# Patient Record
Sex: MALE | Race: WHITE | NOT HISPANIC OR LATINO | Employment: OTHER | URBAN - METROPOLITAN AREA
[De-identification: names, ages, dates, MRNs, and addresses within clinical notes are randomized per-mention and may not be internally consistent; named-entity substitution may affect disease eponyms.]

---

## 2018-01-02 ENCOUNTER — ALLSCRIPTS OFFICE VISIT (OUTPATIENT)
Dept: OTHER | Facility: OTHER | Age: 63
End: 2018-01-02

## 2018-01-02 LAB
BILIRUB UR QL STRIP: NEGATIVE
CLARITY UR: NORMAL
COLOR UR: YELLOW
GLUCOSE (HISTORICAL): NEGATIVE
HGB UR QL STRIP.AUTO: NEGATIVE
KETONES UR STRIP-MCNC: NEGATIVE MG/DL
LEUKOCYTE ESTERASE UR QL STRIP: NEGATIVE
NITRITE UR QL STRIP: NEGATIVE
PH UR STRIP.AUTO: 6 [PH]
PROT UR STRIP-MCNC: NEGATIVE MG/DL
SP GR UR STRIP.AUTO: 1.01
UROBILINOGEN UR QL STRIP.AUTO: 0.2

## 2018-01-23 VITALS
BODY MASS INDEX: 28.79 KG/M2 | WEIGHT: 190 LBS | HEIGHT: 68 IN | DIASTOLIC BLOOD PRESSURE: 58 MMHG | SYSTOLIC BLOOD PRESSURE: 112 MMHG

## 2018-01-30 RX ORDER — HYDROCODONE BITARTRATE AND ACETAMINOPHEN 5; 325 MG/1; MG/1
1 TABLET ORAL
COMMUNITY
Start: 2018-01-02 | End: 2018-02-15 | Stop reason: ALTCHOICE

## 2018-01-30 RX ORDER — LEVOFLOXACIN 500 MG/1
TABLET, FILM COATED ORAL
COMMUNITY
Start: 2018-01-02 | End: 2018-02-15 | Stop reason: ALTCHOICE

## 2018-01-30 RX ORDER — MULTIVIT-MIN/FOLIC/VIT K/LYCOP 400-300MCG
1 TABLET ORAL DAILY
COMMUNITY

## 2018-01-30 RX ORDER — DIAZEPAM 10 MG/1
1 TABLET ORAL
COMMUNITY
Start: 2018-01-02 | End: 2018-02-15 | Stop reason: ALTCHOICE

## 2018-02-01 ENCOUNTER — PROCEDURE VISIT (OUTPATIENT)
Dept: UROLOGY | Facility: MEDICAL CENTER | Age: 63
End: 2018-02-01
Payer: COMMERCIAL

## 2018-02-01 VITALS — SYSTOLIC BLOOD PRESSURE: 120 MMHG | WEIGHT: 188 LBS | DIASTOLIC BLOOD PRESSURE: 64 MMHG | BODY MASS INDEX: 28.59 KG/M2

## 2018-02-01 DIAGNOSIS — R97.20 ELEVATED PSA: Primary | ICD-10-CM

## 2018-02-01 PROCEDURE — G0416 PROSTATE BIOPSY, ANY MTHD: HCPCS | Performed by: UROLOGY

## 2018-02-01 PROCEDURE — 88344 IMHCHEM/IMCYTCHM EA MLT ANTB: CPT | Performed by: UROLOGY

## 2018-02-01 PROCEDURE — 96372 THER/PROPH/DIAG INJ SC/IM: CPT | Performed by: UROLOGY

## 2018-02-01 PROCEDURE — 55700 PR BIOPSY OF PROSTATE,NEEDLE/PUNCH: CPT | Performed by: UROLOGY

## 2018-02-01 PROCEDURE — 76872 US TRANSRECTAL: CPT | Performed by: UROLOGY

## 2018-02-01 RX ORDER — GENTAMICIN SULFATE 40 MG/ML
80 INJECTION, SOLUTION INTRAMUSCULAR; INTRAVENOUS ONCE
Status: COMPLETED | OUTPATIENT
Start: 2018-02-01 | End: 2018-02-01

## 2018-02-01 RX ADMIN — GENTAMICIN SULFATE 80 MG: 40 INJECTION, SOLUTION INTRAMUSCULAR; INTRAVENOUS at 11:26

## 2018-02-01 NOTE — PROGRESS NOTES
Procedure note    Pre-procedure diagnosis:  Elevated PSA  Postprocedure diagnosis:  Same  Procedure:  Transrectal ultrasound of the prostate and prostate needle biopsy  Surgeon:  Rk Ortiz MD    Course of procedure: The patient is a 22-year-old man with a PSA elevation to 14  He is here for prostate biopsy for this reason  The risks of bleeding infection and urinary retention have been explained and he gives informed consent  The patient was prepared in the lateral decubitus position  He was given 80 mg of gentamicin prior to the procedure and already had taken levaquin, Valium and Vicodin for the procedure  Transrectal probe was placed into the rectum and the prostate was visualized  It measured approximately 40 g  overall inspection of the prostate revealed no overt abnormalities  Romina Piedra 1% xylocaine was injected into the area surrounding the neurovascular bundles on each side trans  Rectally, then biopsies were taken from 6 regions, 3 on each side, 2 from each section  Patient tolerated the procedure well and held digital pressure on the prostate for a couple of minutes afterwards  He was told to call if he had fever shakes and chills, or he was unable urinate  He was told to expect blood in the semen, blood in the urine and blood in the stool  I will call him with the results  He is to call if he does not hear from me in a week  He will be set up for an appointment in 6 months with a repeat PSA in the event that the biopsy is negative

## 2018-02-05 DIAGNOSIS — C61 PROSTATE CANCER (HCC): Primary | ICD-10-CM

## 2018-02-08 ENCOUNTER — TELEPHONE (OUTPATIENT)
Dept: UROLOGY | Facility: MEDICAL CENTER | Age: 63
End: 2018-02-08

## 2018-02-08 ENCOUNTER — HOSPITAL ENCOUNTER (OUTPATIENT)
Dept: NUCLEAR MEDICINE | Facility: HOSPITAL | Age: 63
Discharge: HOME | End: 2018-02-08
Attending: UROLOGY
Payer: COMMERCIAL

## 2018-02-08 ENCOUNTER — HOSPITAL ENCOUNTER (OUTPATIENT)
Dept: CT IMAGING | Facility: HOSPITAL | Age: 63
Discharge: HOME | End: 2018-02-08
Attending: UROLOGY
Payer: COMMERCIAL

## 2018-02-08 DIAGNOSIS — C61 PROSTATE CANCER (HCC): ICD-10-CM

## 2018-02-08 PROCEDURE — 78306 BONE IMAGING WHOLE BODY: CPT

## 2018-02-08 PROCEDURE — 74177 CT ABD & PELVIS W/CONTRAST: CPT

## 2018-02-08 PROCEDURE — A9503 TC99M MEDRONATE: HCPCS

## 2018-02-08 RX ADMIN — IOHEXOL 100 ML: 350 INJECTION, SOLUTION INTRAVENOUS at 09:46

## 2018-02-08 RX ADMIN — IOHEXOL 50 ML: 240 INJECTION, SOLUTION INTRATHECAL; INTRAVASCULAR; INTRAVENOUS; ORAL at 08:00

## 2018-02-08 NOTE — TELEPHONE ENCOUNTER
----- Message from Kyra Cali MD sent at 2/8/2018  4:30 PM EST -----  Please inform patient that the results are normal

## 2018-02-12 ENCOUNTER — OFFICE VISIT (OUTPATIENT)
Dept: UROLOGY | Facility: MEDICAL CENTER | Age: 63
End: 2018-02-12
Payer: COMMERCIAL

## 2018-02-12 VITALS
DIASTOLIC BLOOD PRESSURE: 60 MMHG | HEIGHT: 68 IN | BODY MASS INDEX: 28.95 KG/M2 | SYSTOLIC BLOOD PRESSURE: 118 MMHG | WEIGHT: 191 LBS

## 2018-02-12 DIAGNOSIS — R97.20 ELEVATED PSA: Primary | ICD-10-CM

## 2018-02-12 LAB
SL AMB  POCT GLUCOSE, UA: NEGATIVE
SL AMB LEUKOCYTE ESTERASE,UA: ABNORMAL
SL AMB POCT BILIRUBIN,UA: NEGATIVE
SL AMB POCT BLOOD,UA: ABNORMAL
SL AMB POCT CLARITY,UA: CLEAR
SL AMB POCT COLOR,UA: YELLOW
SL AMB POCT KETONES,UA: NEGATIVE
SL AMB POCT NITRITE,UA: NEGATIVE
SL AMB POCT PH,UA: 5.5
SL AMB POCT SPECIFIC GRAVITY,UA: <=1.005
SL AMB POCT URINE PROTEIN: NEGATIVE
SL AMB POCT UROBILINOGEN: 0.2

## 2018-02-12 PROCEDURE — 81003 URINALYSIS AUTO W/O SCOPE: CPT | Performed by: UROLOGY

## 2018-02-12 PROCEDURE — 99214 OFFICE O/P EST MOD 30 MIN: CPT | Performed by: UROLOGY

## 2018-02-12 NOTE — LETTER
2018     M[place] Santos, [address] [facility]    Patient: Diego Arciniega   YOB: 1955   Date of Visit: 2018       Dear Dr Lindy Justin: Thank you for referring Vivienne Laguna to me for evaluation  Below are my notes for this consultation  If you have questions, please do not hesitate to call me  I look forward to following your patient along with you  Sincerely,        Mona Gibbs MD        CC: No Recipients  Mona Gibbs MD  2018  9:25 AM  Sign at close encounter  [address] [facility]  [place] [phone]  www  [facility]  org      NAME: Diego Arciniega  AGE: 58 y o  SEX: male  : 1955   MRN: [id number]    DATE: 2018  TIME: 9:03 AM    Assessment and Plan:  Prostate cancer as outlined  We discussed his options  I recommend laparoscopic robotic prostatectomy and will refer him to Dr Giselle Fernandez for consultation regarding this  Metastatic workup is negative  Return to office in:  As above    Chief Complaint   No chief complaint on file  History of Present Illness   Shae 7 and 6 CAP, encompassing the entire gland found on TRUS bx 2018, PSA 14 6  CT/Bone scan negative  Here to discuss options  Options mention were robotic prostatectomy, radiation therapy, cryotherapy, and some other therapies were discussed  Given the volume of disease, and is age in good health status, I recommend robotic prostatectomy  The following portions of the patient's history were reviewed and updated as appropriate: allergies, current medications, past family history, past medical history, past social history, past surgical history and problem list     Review of Systems   Review of Systems    Active Problem List   There is no problem list on file for this patient        Objective   /60 (BP Location: Left arm, Patient Position: Sitting)   Ht 5' 8" (1 727 m)   Wt 86 6 kg (191 lb)   BMI 29 04 kg/m²      Physical Exam    Pertinent Laboratory/Diagnostic Studies:  {[id number]}    Current Medications     Current Outpatient Prescriptions:     diazepam (VALIUM) 10 mg tablet, Take 1 tablet by mouth, Disp: , Rfl:     HYDROcodone-acetaminophen (NORCO) 5-325 mg per tablet, Take 1 tablet by mouth, Disp: , Rfl:     levofloxacin (LEVAQUIN) 500 mg tablet, Take by mouth, Disp: , Rfl:     Multiple Vitamins-Minerals (ONE DAILY MULTIVITAMIN ADULT) TABS, Take 1 tablet by mouth daily, Disp: , Rfl:     Omega-3 Fatty Acids (FISH OIL) 645 MG CAPS, Take 3 capsules by mouth daily, Disp: , Rfl:         Lesvia Malin MD

## 2018-02-12 NOTE — LETTER
2018     [place], [address] [place] [address]    Patient: Taina Zamudio   YOB: 1955   Date of Visit: 2018       Dear Dr Valery Walls: Thank you for referring Vincenzo Martinez to me for evaluation  Below are my notes for this consultation  If you have questions, please do not hesitate to call me  I look forward to following your patient along with you  Sincerely,        Karuna Gonzalez MD        CC: No Recipients  Karuna Gonzalez MD  2018  9:26 AM  Sign at close encounter  [address] [facility]  [address], [address] [place] [phone]  www  [facility]  org      NAME: Taina Zamudio  AGE: 58 y o  SEX: male  : 1955   MRN: [id number]    DATE: 2018  TIME: 9:03 AM    Assessment and Plan:  Prostate cancer as outlined  We discussed his options  I recommend laparoscopic robotic prostatectomy and will refer him to Dr Aron Arguelles for consultation regarding this  Metastatic workup is negative  Return to office in:  As above    Chief Complaint   No chief complaint on file  History of Present Illness   Shae 7 and 6 CAP, encompassing the entire gland found on TRUS bx 2018, PSA 14 6  CT/Bone scan negative  Here to discuss options  Options mention were robotic prostatectomy, radiation therapy, cryotherapy, and some other therapies were discussed  Given the volume of disease, and is age in good health status, I recommend robotic prostatectomy  The following portions of the patient's history were reviewed and updated as appropriate: allergies, current medications, past family history, past medical history, past social history, past surgical history and problem list     Review of Systems   Review of Systems    Active Problem List   There is no problem list on file for this patient        Objective   /60 (BP Location: Left arm, Patient Position: Sitting)   Ht 5' 8" (1 727 m)   Wt 86 6 kg (191 lb)   BMI 29 04 kg/m²      Physical Exam    Pertinent Laboratory/Diagnostic Studies:  CBC: No results found for: WBC, RBC, HGB, HCT, MCV, MCH, MCHC, RDW, MPV, PLT, NRBC, NEUTOPHILPCT, LYMPHOPCT, MONOPCT, EOSPCT, BASOPCT, NEUTROABS, LYMPHSABS, MONOSABS, EOSABS, MONOSABS  Chemistry Profile:   Lab Results   Component Value Date/Time    SLAMBGLUCOSE negative 02/12/2018 08:58 AM       Current Medications     Current Outpatient Prescriptions:     diazepam (VALIUM) 10 mg tablet, Take 1 tablet by mouth, Disp: , Rfl:     HYDROcodone-acetaminophen (NORCO) 5-325 mg per tablet, Take 1 tablet by mouth, Disp: , Rfl:     levofloxacin (LEVAQUIN) 500 mg tablet, Take by mouth, Disp: , Rfl:     Multiple Vitamins-Minerals (ONE DAILY MULTIVITAMIN ADULT) TABS, Take 1 tablet by mouth daily, Disp: , Rfl:     Omega-3 Fatty Acids (FISH OIL) 645 MG CAPS, Take 3 capsules by mouth daily, Disp: , Rfl:         Matthew Hernandez MD

## 2018-02-12 NOTE — PROGRESS NOTES
[address] [facility]  [facility]  [address] [facility]  [place], [address] [facility]  [phone]  www  [facility]  org      NAME: Taina Zamudio  AGE: 58 y o  SEX: male  : 1955   MRN: [id number]    DATE: 2018  TIME: 9:03 AM    Assessment and Plan:  Prostate cancer as outlined  We discussed his options  I recommend laparoscopic robotic prostatectomy and will refer him to Dr Aron Arguelles for consultation regarding this  Metastatic workup is negative  Return to office in:  As above    Chief Complaint   No chief complaint on file  History of Present Illness   Shae 7 and 6 CAP, encompassing the entire gland found on TRUS bx 2018, PSA 14 6  CT/Bone scan negative  Here to discuss options  Options mention were robotic prostatectomy, radiation therapy, cryotherapy, and some other therapies were discussed  Given the volume of disease, and is age in good health status, I recommend robotic prostatectomy  The following portions of the patient's history were reviewed and updated as appropriate: allergies, current medications, past family history, past medical history, past social history, past surgical history and problem list     Review of Systems   Review of Systems    Active Problem List   There is no problem list on file for this patient        Objective   /60 (BP Location: Left arm, Patient Position: Sitting)   Ht 5' 8" (1 727 m)   Wt 86 6 kg (191 lb)   BMI 29 04 kg/m²     Physical Exam    Pertinent Laboratory/Diagnostic Studies:  CBC: No results found for: WBC, RBC, HGB, HCT, MCV, MCH, MCHC, RDW, MPV, PLT, NRBC, NEUTOPHILPCT, LYMPHOPCT, MONOPCT, EOSPCT, BASOPCT, NEUTROABS, LYMPHSABS, MONOSABS, EOSABS, MONOSABS  Chemistry Profile:   Lab Results   Component Value Date/Time    SLAMBGLUCOSE negative 2018 08:58 AM       Current Medications     Current Outpatient Prescriptions:     diazepam (VALIUM) 10 mg tablet, Take 1 tablet by mouth, Disp: , Rfl:     HYDROcodone-acetaminophen (NORCO) 5-325 mg per tablet, Take 1 tablet by mouth, Disp: , Rfl:     levofloxacin (LEVAQUIN) 500 mg tablet, Take by mouth, Disp: , Rfl:     Multiple Vitamins-Minerals (ONE DAILY MULTIVITAMIN ADULT) TABS, Take 1 tablet by mouth daily, Disp: , Rfl:     Omega-3 Fatty Acids (FISH OIL) 645 MG CAPS, Take 3 capsules by mouth daily, Disp: , Rfl:         Jere Reyes MD

## 2018-02-15 ENCOUNTER — OFFICE VISIT (OUTPATIENT)
Dept: UROLOGY | Facility: MEDICAL CENTER | Age: 63
End: 2018-02-15
Payer: COMMERCIAL

## 2018-02-15 VITALS
BODY MASS INDEX: 28.95 KG/M2 | WEIGHT: 191 LBS | DIASTOLIC BLOOD PRESSURE: 68 MMHG | SYSTOLIC BLOOD PRESSURE: 116 MMHG | HEIGHT: 68 IN

## 2018-02-15 DIAGNOSIS — N13.8 BPH WITH OBSTRUCTION/LOWER URINARY TRACT SYMPTOMS: ICD-10-CM

## 2018-02-15 DIAGNOSIS — C61 PROSTATE CANCER (HCC): Primary | ICD-10-CM

## 2018-02-15 DIAGNOSIS — N40.1 BPH WITH OBSTRUCTION/LOWER URINARY TRACT SYMPTOMS: ICD-10-CM

## 2018-02-15 PROCEDURE — 99205 OFFICE O/P NEW HI 60 MIN: CPT | Performed by: UROLOGY

## 2018-02-15 NOTE — LETTER
February 15, 2018     [facility] [address] [place] [address]    Patient: Khanh Hendrix   YOB: 1955   Date of Visit: 2/15/2018       Dear Dr Amor Chaparro: Thank you for referring Lux Copeland to me for evaluation  Below are my notes for this consultation  If you have questions, please do not hesitate to call me  I look forward to following your patient along with you  Sincerely,        Kellie Gomez MD        CC: No Recipients  Kellie Gomez MD  2/15/2018  2:38 PM  Sign at close encounter    Khanh Hendrix 58 y o  male MRN: [id number]  Unit/Bed#:  Encounter: [id number]    Assessment/Plan   Assessment:  Localized prostate cancer    Plan:  Plan:  After careful review and summary of the medical history and all laboratory and radiographic studies as outlined above, I discussed with the patient and family members at length the finding of adenocarcinoma of the prostate  We discussed the patient's grade AUSTIN SCORE 6 AND 7, other parameters, and clinical stage based upon the above findings  I then discussed the options available to him at this time, based on his clinically localized prostate cancer  These include observation, radiation therapy, radical prostatectomy and cryosurgery  Regarding observation, they understand this is ideally suited for men with either clinically insignificant or slow growing cancer or for whom because of age or other concurrent medical conditions the risks of treatment are greater than the potential benefits of treatment  While this option avoids treatment-associated side effects, I explained it requires frequent evaluation in order to monitor for potential disease progression, and that despite vigilant follow-up there will be cases where the cancer spreads and is no longer potentially curable with local therapies   I reiterated that for many men, watchful waiting can be associated with increased anxiety and stress concerning their diagnosis, and that commonly patients only defer definitive treatment to a later date  With regard to radiation therapy, I reviewed the options available  These include external beam radiation therapy versus brachytherapy  Regarding XRT, we discussed the differences in conventional versus 3D conformal external beam radiation therapy versus IMRT  The indications, risks and potential complications of each of these options as well as the rationale for using selective radiation options were reviewed in detail  We also discussed interstitial seed therapy and risk stratification as a determinant for optimal therapies  All questions were again answered  I did offer to refer the patient to the radiation oncologists directly in order to further clarify these issues  With regard to the surgical options, we reviewed the different options including a radical perineal prostatectomy versus the radical retropubic prostatectomy via open, laparoscopic and robotic approach  I explained the operations that I primarily perform, robotic-assisted laparoscopic radical prostatectomy, and compared it with the alternative that I am also experienced with, traditional open retropubic prostatectomy  I also mentioned the less commonly used but accepted perineal approach  The advantages and limitations of these various approaches were described in detail  The anticipated hospital and post-operative courses were reviewed  I highlighted the relevant anatomy, and we discussed the importance of neurovascular bundle preservation (nerve-sparing) to minimize negative effects on erectile function and continence   While in the majority of cases bilateral nerve-sparing is appropriate and possible, they understand that in certain circumstances intentional partial or complete unilateral or bilateral neurovascular bundle resection is necessary when there is suspicion that cancer extends into that region; this may be determined either preoperatively or intraoperatively with or without biopsy  The patient is clearly aware that he may still be rendered sexually impotent and incontinent as a consequence of the operative procedure  The possibility of stress-type urinary incontinence associated with either form of radical prostatectomy was also detailed for the patient  I explained that concurrent pelvic lymphadenectomy may be performed, for intermediate, or high risk clinical disease or suspicious intraoperative adenopathy, which serves both a diagnostic and potentially therapeutic purpose if metastatic disease is identified  We discussed the general risks of surgery, which include but are not limited to infection, bleeding, medical and anesthetic complications, and adjacent organ involvement or injury  Because of the large volume of cancer on his preoperative biopsy, I made aware that he may be at risk for needing adjuvant radiation therapy if extraprostatic extension of his cancer is found on the final pathologic specimen analysis  With regard to cryosurgery, this is felt to be an experimental option at this time secondary to insufficient data to support its routine use in this clinical setting  Regarding hormonal therapy, we discussed it is primarily used as systemic therapy for patients with advanced or metastatic disease, but that it also has been shown to be beneficial in conjunction with radiation therapy for certain categories of patients  The patient understands that hormonal therapy alone is not considered curative treatment and will slow the growth of but not eliminate prostate cancer  The patient was advised to become an active participant in their care and to become proactive in their care  They were encouraged to call my office with any unanswered questions or seek additional medical opinions at their discretion   They understand that the decision as to which approach to take is one made based on the medical risks and benefits as well as their own informed tolerance of this risk  He was offered referral to radiation oncology and is interested in surgery  Once the patient is scheduled for a RALP/PLND, he will need preadmission testing and anesthesia clearance  Kegel exercises were reviewed, and he may be seen, pre operatively, by our continence nurse  I have discussed the risks, benefits and alternatives to the proposed procedure/treatment plan with the patient   Our discussion also included the risks and benefits of the alternatives, including doing nothing  He was encouraged to ask questions and all questions were answered to their satisfaction  At the end of our discussion the patient gave their verbal consent to the proposed procedure/treatment plan, robotic radical prostatectomy and BTLND  History of Present Illness      HPI:  Cuba Arteaga is a 58 y o  male who presents with PSA of 14  Underwent a prostate biopsy results of which are below:  A  Prostate, left lateral base, core needle biopsy:             - Prostatic adenocarcinoma, Shae score 3 + 3 = 6, Prognostic Grade Group I, discontinuously involving approximately 50% of one core biopsy  - The diagnosis is supported by negative staining for basal cell markers (p63/), and positive luminal staining for p504s (prostate triple stain, performed with an appropriate control)      B  Prostate, left base, core needle biopsy:             - Benign prostate glands  - No malignancy is identified      C  Prostate, right base, core needle biopsy:             - Prostatic adenocarcinoma, Shae score 3 + 3 = 6, Prognostic Grade Group I, involving less than 5% of one core biopsy  - The diagnosis is supported by negative staining for basal cell markers (p63/), and positive luminal staining for p504s (prostate triple stain, performed with an appropriate control)      D   Prostate, right lateral base, core needle biopsy:             - Benign prostate glands  - No malignancy is identified      E  Prostate, left lateral mid, core needle biopsy:             - Prostatic adenocarcinoma, Shae score 3 + 3 = 6, Prognostic Grade Group I, discontinuously involving 90% of one core biopsy  - The diagnosis is supported by negative staining for basal cell markers (p63/), and positive luminal staining for p504s (prostate triple stain, performed with an appropriate control)      F  Prostate, left mid, core needle biopsy:             - Prostatic adenocarcinoma, Troy score 3 + 3 = 6, Prognostic Grade Group I, discontinuously involving 60% of one core biopsy  - The diagnosis is supported by negative staining for basal cell markers (p63/), and positive luminal staining for p504s (prostate triple stain, performed with an appropriate control)      G  Prostate, right mid, core needle biopsy:             - Prostatic adenocarcinoma, Shae score 3 + 3 = 6, Prognostic Grade Group I, involving less than 5% of one core biopsy  - The diagnosis is supported by negative staining for basal cell markers (p63/), and positive luminal staining for p504s (prostate triple stain, performed with an appropriate control)       H  Prostate, right lateral mid, core needle biopsy:             - Prostatic adenocarcinoma, Shae score 3 + 3 = 6, Prognostic Grade Group I, involving 20% of one core biopsy  - The diagnosis is supported by negative staining for basal cell markers (p63/), and positive luminal staining for p504s (prostate triple stain, performed with an appropriate control)      I  Prostate, left lateral apex, core needle biopsy:             - Prostatic adenocarcinoma, Troy score 3 + 4 = 7, Prognostic Grade Group II, involving 80% of one core biopsy              - Less than 5% Troy grade 4 prostatic adenocarcinoma               - The diagnosis is supported by negative staining for basal cell markers (p63/), and positive luminal staining for p504s (prostate triple stain, performed with an appropriate control)      J  Prostate, left apex, core needle biopsy:             - Prostatic adenocarcinoma, Shae score 3 + 4 = 7, Prognostic Grade Group II, involving 90% of one core biopsy              - Less than 10% Shae grade 4 prostatic adenocarcinoma  - The diagnosis is supported by negative staining for basal cell markers (p63/), and positive luminal staining for p504s (prostate triple stain, performed with an appropriate control)      K  Prostate, right apex, core needle biopsy:             - Benign prostate glands  - No malignancy is identified      L  Prostate, right lateral apex, core needle biopsy:             - Prostatic adenocarcinoma, Shae score 3 + 3 = 6, Prognostic Grade Group I, involving approximately 5% of one core biopsy  - The diagnosis is supported by negative staining for basal cell markers (p63/), and positive luminal staining for p504s (prostate triple stain, performed with an appropriate control)  Review of Systems   Constitutional: Negative  HENT: Negative  Eyes: Negative  Respiratory: Negative  Cardiovascular: Negative  Gastrointestinal: Negative  Endocrine: Negative  Genitourinary: Positive for urgency  Musculoskeletal: Positive for arthralgias and back pain  Skin: Negative  Allergic/Immunologic: Negative  Neurological: Negative  Hematological: Negative  Psychiatric/Behavioral: Negative  Historical Information   Past Medical History:   Diagnosis Date    BPH with obstruction/lower urinary tract symptoms 2013    Elevated PSA 2013     Past Surgical History:   Procedure Laterality Date    PROSTATE BIOPSY  2018 2007  RT   INGUINAL HERNIA REPAIR LAPAROSCOPIC APPROACH WITH POSSIBLE MESH    Social History   History   Alcohol Use    1 2 oz/week    2 Cans of beer per week     Comment: socially     History   Drug Use No     History   Smoking Status    Former Smoker    Types: Cigarettes    Quit date: 2/1/1998   Smokeless Tobacco    Never Used     Family History:   Family History   Problem Relation Age of Onset    Heart disease Father        Meds/Allergies   all medications and allergies reviewed  Allergies   Allergen Reactions    Sulfa Antibiotics        Objective   Vitals: Blood pressure 116/68, height 5' 8" (1 727 m), weight 86 6 kg (191 lb)  Invasive Devices          No matching active lines, drains, or airways          Physical Exam   Constitutional: He is oriented to person, place, and time  He appears well-developed  HENT:   Head: Normocephalic and atraumatic  Eyes: Conjunctivae and EOM are normal  Pupils are equal, round, and reactive to light  Neck: Normal range of motion  Neck supple  Cardiovascular: Normal rate, regular rhythm, normal heart sounds and intact distal pulses  Pulmonary/Chest: Effort normal and breath sounds normal    Abdominal: Soft  Bowel sounds are normal    Genitourinary: Rectum normal, prostate normal and penis normal    Genitourinary Comments: About 60 gms  Musculoskeletal: Normal range of motion  Neurological: He is alert and oriented to person, place, and time  Skin: Skin is warm and dry  Psychiatric: He has a normal mood and affect  His behavior is normal  Judgment and thought content normal        Lab Results: I have personally reviewed pertinent reports  Imaging: I have personally reviewed pertinent reports  EKG, Pathology, and Other Studies: I have personally reviewed pertinent reports  Counseling / Coordination of Care  Total floor / unit time spent today 80 minutes  Greater than 50% of total time was spent with the patient and / or family counseling and / or coordination of care    A description of the counseling / coordination of care:

## 2018-02-15 NOTE — H&P
H&P Exam - Urology   Taina Zamudio 58 y o  male MRN: [id number]  Unit/Bed#:  Encounter: [id number]    Assessment/Plan   Assessment:  Localized prostate cancer    Plan:  Plan:  LAP ROBOTIC RADICAL PROSTATECTOMY AND THE PL AND BPLND  After careful review and summary of the medical history and all laboratory and radiographic studies as outlined above, I discussed with the patient and family members at length the finding of adenocarcinoma of the prostate  We discussed the patient's grade ([place] score 6 and 7), other parameters, and clinical stage based upon the above findings  I then discussed the options available to him at this time, based on his clinically localized prostate cancer  These include observation, radiation therapy, radical prostatectomy and cryosurgery  Regarding observation, they understand this is ideally suited for men with either clinically insignificant or slow growing cancer or for whom because of age or other concurrent medical conditions the risks of treatment are greater than the potential benefits of treatment  While this option avoids treatment-associated side effects, I explained it requires frequent evaluation in order to monitor for potential disease progression, and that despite vigilant follow-up there will be cases where the cancer spreads and is no longer potentially curable with local therapies  I reiterated that for many men, watchful waiting can be associated with increased anxiety and stress concerning their diagnosis, and that commonly patients only defer definitive treatment to a later date  With regard to radiation therapy, I reviewed the options available  These include external beam radiation therapy versus brachytherapy  Regarding XRT, we discussed the differences in conventional versus 3D conformal external beam radiation therapy versus IMRT   The indications, risks and potential complications of each of these options as well as the rationale for using selective radiation options were reviewed in detail  We also discussed interstitial seed therapy and risk stratification as a determinant for optimal therapies  All questions were again answered  I did offer to refer the patient to the radiation oncologists directly in order to further clarify these issues  With regard to the surgical options, we reviewed the different options including a radical perineal prostatectomy versus the radical retropubic prostatectomy via open, laparoscopic and robotic approach  I explained the operations that I primarily perform, robotic-assisted laparoscopic radical prostatectomy, and compared it with the alternative that I am also experienced with, traditional open retropubic prostatectomy  I also mentioned the less commonly used but accepted perineal approach  The advantages and limitations of these various approaches were described in detail  The anticipated hospital and post-operative courses were reviewed  I highlighted the relevant anatomy, and we discussed the importance of neurovascular bundle preservation (nerve-sparing) to minimize negative effects on erectile function and continence  While in the majority of cases bilateral nerve-sparing is appropriate and possible, they understand that in certain circumstances intentional partial or complete unilateral or bilateral neurovascular bundle resection is necessary when there is suspicion that cancer extends into that region; this may be determined either preoperatively or intraoperatively with or without biopsy  The patient is clearly aware that he may still be rendered sexually impotent and incontinent as a consequence of the operative procedure  The possibility of stress-type urinary incontinence associated with either form of radical prostatectomy was also detailed for the patient   I explained that concurrent pelvic lymphadenectomy may be performed, for intermediate, or high risk clinical disease or suspicious intraoperative adenopathy, which serves both a diagnostic and potentially therapeutic purpose if metastatic disease is identified  We discussed the general risks of surgery, which include but are not limited to infection, bleeding, medical and anesthetic complications, and adjacent organ involvement or injury  Being the patient had a prior laparoscopic inguinal hernia repair with mesh in the past, because of substantial risk of perivesical adhesions preventing access the appropriate regions of the pelvis via the robotic laparoscopic approach, he and his wife are aware and understand there is a distinct chance that we may need to convert the patient to an open traditional retropubic approach  With regard to cryosurgery, this is felt to be an experimental option at this time secondary to insufficient data to support its routine use in this clinical setting  Regarding hormonal therapy, we discussed it is primarily used as systemic therapy for patients with advanced or metastatic disease, but that it also has been shown to be beneficial in conjunction with radiation therapy for certain categories of patients  The patient understands that hormonal therapy alone is not considered curative treatment and will slow the growth of but not eliminate prostate cancer  The patient was advised to become an active participant in their care and to become proactive in their care  They were encouraged to call my office with any unanswered questions or seek additional medical opinions at their discretion  They understand that the decision as to which approach to take is one made based on the medical risks and benefits as well as their own informed tolerance of this risk  He was offered referral to radiation oncology and is interested in surgery  Once the patient is scheduled for a RALP/PLND, he will need preadmission testing and anesthesia clearance   Kegel exercises were reviewed, and he may be seen, pre operatively, by our continence nurse  I have discussed the risks, benefits and alternatives to the proposed procedure/treatment plan with the patient   Our discussion also included the risks and benefits of the alternatives, including doing nothing  He was encouraged to ask questions and all questions were answered to their satisfaction  At the end of our discussion the patient gave their verbal consent to the proposed procedure/treatment plan, robotic radical prostatectomy and BTLND  History of Present Illness      HPI:  Melanie Bernard is a 58 y o  male who presents with PSA of 14  Underwent a prostate biopsy results of which are below:  A  Prostate, left lateral base, core needle biopsy:             - Prostatic adenocarcinoma, Paris score 3 + 3 = 6, Prognostic Grade Group I, discontinuously involving approximately 50% of one core biopsy  - The diagnosis is supported by negative staining for basal cell markers (p63/), and positive luminal staining for p504s (prostate triple stain, performed with an appropriate control)      B  Prostate, left base, core needle biopsy:             - Benign prostate glands  - No malignancy is identified      C  Prostate, right base, core needle biopsy:             - Prostatic adenocarcinoma, Paris score 3 + 3 = 6, Prognostic Grade Group I, involving less than 5% of one core biopsy  - The diagnosis is supported by negative staining for basal cell markers (p63/), and positive luminal staining for p504s (prostate triple stain, performed with an appropriate control)      D  Prostate, right lateral base, core needle biopsy:             - Benign prostate glands  - No malignancy is identified      E  Prostate, left lateral mid, core needle biopsy:             - Prostatic adenocarcinoma, Shae score 3 + 3 = 6, Prognostic Grade Group I, discontinuously involving 90% of one core biopsy               - The diagnosis is supported by negative staining for basal cell markers (p63/), and positive luminal staining for p504s (prostate triple stain, performed with an appropriate control)      F  Prostate, left mid, core needle biopsy:             - Prostatic adenocarcinoma, Dunlow score 3 + 3 = 6, Prognostic Grade Group I, discontinuously involving 60% of one core biopsy  - The diagnosis is supported by negative staining for basal cell markers (p63/), and positive luminal staining for p504s (prostate triple stain, performed with an appropriate control)      G  Prostate, right mid, core needle biopsy:             - Prostatic adenocarcinoma, Shae score 3 + 3 = 6, Prognostic Grade Group I, involving less than 5% of one core biopsy  - The diagnosis is supported by negative staining for basal cell markers (p63/), and positive luminal staining for p504s (prostate triple stain, performed with an appropriate control)       H  Prostate, right lateral mid, core needle biopsy:             - Prostatic adenocarcinoma, Shae score 3 + 3 = 6, Prognostic Grade Group I, involving 20% of one core biopsy  - The diagnosis is supported by negative staining for basal cell markers (p63/), and positive luminal staining for p504s (prostate triple stain, performed with an appropriate control)      I  Prostate, left lateral apex, core needle biopsy:             - Prostatic adenocarcinoma, Shae score 3 + 4 = 7, Prognostic Grade Group II, involving 80% of one core biopsy              - Less than 5% Dunlow grade 4 prostatic adenocarcinoma  - The diagnosis is supported by negative staining for basal cell markers (p63/), and positive luminal staining for p504s (prostate triple stain, performed with an appropriate control)      J  Prostate, left apex, core needle biopsy:             - Prostatic adenocarcinoma, Shae score 3 + 4 = 7, Prognostic Grade Group II, involving 90% of one core biopsy               - Less than 10% Shae grade 4 prostatic adenocarcinoma  - The diagnosis is supported by negative staining for basal cell markers (p63/), and positive luminal staining for p504s (prostate triple stain, performed with an appropriate control)      K  Prostate, right apex, core needle biopsy:             - Benign prostate glands  - No malignancy is identified      L  Prostate, right lateral apex, core needle biopsy:             - Prostatic adenocarcinoma, Shae score 3 + 3 = 6, Prognostic Grade Group I, involving approximately 5% of one core biopsy  - The diagnosis is supported by negative staining for basal cell markers (p63/), and positive luminal staining for p504s (prostate triple stain, performed with an appropriate control)  Review of Systems   Constitutional: Negative  HENT: Negative  Eyes: Negative  Respiratory: Negative  Cardiovascular: Negative  Gastrointestinal: Negative  Endocrine: Negative  Genitourinary: Positive for urgency  Musculoskeletal: Positive for arthralgias and back pain  Skin: Negative  Allergic/Immunologic: Negative  Neurological: Negative  Hematological: Negative  Psychiatric/Behavioral: Negative          Historical Information   Past Medical History:   Diagnosis Date    BPH with obstruction/lower urinary tract symptoms 2013    Elevated PSA 2013     Past Surgical History:   Procedure Laterality Date    PROSTATE BIOPSY  2018     Social History   History   Alcohol Use    1 2 oz/week    2 Cans of beer per week     Comment: socially     History   Drug Use No     History   Smoking Status    Former Smoker    Types: Cigarettes    Quit date: 2/1/1998   Smokeless Tobacco    Never Used     Family History:   Family History   Problem Relation Age of Onset    Heart disease Father        Meds/Allergies   all medications and allergies reviewed  Allergies   Allergen Reactions    Sulfa Antibiotics Objective   Vitals: Blood pressure 116/68, height 5' 8" (1 727 m), weight 86 6 kg (191 lb)  Invasive Devices          No matching active lines, drains, or airways          Physical Exam   Constitutional: He is oriented to person, place, and time  He appears well-developed  HENT:   Head: Normocephalic and atraumatic  Eyes: Conjunctivae and EOM are normal  Pupils are equal, round, and reactive to light  Neck: Normal range of motion  Neck supple  Cardiovascular: Normal rate, regular rhythm, normal heart sounds and intact distal pulses  Pulmonary/Chest: Effort normal and breath sounds normal    Abdominal: Soft  Bowel sounds are normal    Genitourinary: Rectum normal, prostate normal and penis normal    Genitourinary Comments: About 60 gms  Musculoskeletal: Normal range of motion  Neurological: He is alert and oriented to person, place, and time  Skin: Skin is warm and dry  Psychiatric: He has a normal mood and affect  His behavior is normal  Judgment and thought content normal        Lab Results: I have personally reviewed pertinent reports  Imaging: I have personally reviewed pertinent reports  EKG, Pathology, and Other Studies: I have personally reviewed pertinent reports  Counseling / Coordination of Care  Total floor / unit time spent today 80 minutes  Greater than 50% of total time was spent with the patient and / or family counseling and / or coordination of care    A description of the counseling / coordination of care:

## 2018-02-15 NOTE — PROGRESS NOTES
Nancy Brunson 58 y o  male MRN: [id number]  Unit/Bed#:  Encounter: [id number]    Assessment/Plan   Assessment:  Localized prostate cancer    Plan:  Plan:  After careful review and summary of the medical history and all laboratory and radiographic studies as outlined above, I discussed with the patient and family members at length the finding of adenocarcinoma of the prostate  We discussed the patient's grade AUSTIN SCORE 6 AND 7, other parameters, and clinical stage based upon the above findings  I then discussed the options available to him at this time, based on his clinically localized prostate cancer  These include observation, radiation therapy, radical prostatectomy and cryosurgery  Regarding observation, they understand this is ideally suited for men with either clinically insignificant or slow growing cancer or for whom because of age or other concurrent medical conditions the risks of treatment are greater than the potential benefits of treatment  While this option avoids treatment-associated side effects, I explained it requires frequent evaluation in order to monitor for potential disease progression, and that despite vigilant follow-up there will be cases where the cancer spreads and is no longer potentially curable with local therapies  I reiterated that for many men, watchful waiting can be associated with increased anxiety and stress concerning their diagnosis, and that commonly patients only defer definitive treatment to a later date  With regard to radiation therapy, I reviewed the options available  These include external beam radiation therapy versus brachytherapy  Regarding XRT, we discussed the differences in conventional versus 3D conformal external beam radiation therapy versus IMRT  The indications, risks and potential complications of each of these options as well as the rationale for using selective radiation options were reviewed in detail   We also discussed interstitial seed therapy and risk stratification as a determinant for optimal therapies  All questions were again answered  I did offer to refer the patient to the radiation oncologists directly in order to further clarify these issues  With regard to the surgical options, we reviewed the different options including a radical perineal prostatectomy versus the radical retropubic prostatectomy via open, laparoscopic and robotic approach  I explained the operations that I primarily perform, robotic-assisted laparoscopic radical prostatectomy, and compared it with the alternative that I am also experienced with, traditional open retropubic prostatectomy  I also mentioned the less commonly used but accepted perineal approach  The advantages and limitations of these various approaches were described in detail  The anticipated hospital and post-operative courses were reviewed  I highlighted the relevant anatomy, and we discussed the importance of neurovascular bundle preservation (nerve-sparing) to minimize negative effects on erectile function and continence  While in the majority of cases bilateral nerve-sparing is appropriate and possible, they understand that in certain circumstances intentional partial or complete unilateral or bilateral neurovascular bundle resection is necessary when there is suspicion that cancer extends into that region; this may be determined either preoperatively or intraoperatively with or without biopsy  The patient is clearly aware that he may still be rendered sexually impotent and incontinent as a consequence of the operative procedure  The possibility of stress-type urinary incontinence associated with either form of radical prostatectomy was also detailed for the patient   I explained that concurrent pelvic lymphadenectomy may be performed, for intermediate, or high risk clinical disease or suspicious intraoperative adenopathy, which serves both a diagnostic and potentially therapeutic purpose if metastatic disease is identified  We discussed the general risks of surgery, which include but are not limited to infection, bleeding, medical and anesthetic complications, and adjacent organ involvement or injury  Because of the large volume of cancer on his preoperative biopsy, I made aware that he may be at risk for needing adjuvant radiation therapy if extraprostatic extension of his cancer is found on the final pathologic specimen analysis  With regard to cryosurgery, this is felt to be an experimental option at this time secondary to insufficient data to support its routine use in this clinical setting  Regarding hormonal therapy, we discussed it is primarily used as systemic therapy for patients with advanced or metastatic disease, but that it also has been shown to be beneficial in conjunction with radiation therapy for certain categories of patients  The patient understands that hormonal therapy alone is not considered curative treatment and will slow the growth of but not eliminate prostate cancer  The patient was advised to become an active participant in their care and to become proactive in their care  They were encouraged to call my office with any unanswered questions or seek additional medical opinions at their discretion  They understand that the decision as to which approach to take is one made based on the medical risks and benefits as well as their own informed tolerance of this risk  He was offered referral to radiation oncology and is interested in surgery  Once the patient is scheduled for a RALP/PLND, he will need preadmission testing and anesthesia clearance  Kegel exercises were reviewed, and he may be seen, pre operatively, by our continence nurse  I have discussed the risks, benefits and alternatives to the proposed procedure/treatment plan with the patient   Our discussion also included the risks and benefits of the alternatives, including doing nothing   He was encouraged to ask questions and all questions were answered to their satisfaction  At the end of our discussion the patient gave their verbal consent to the proposed procedure/treatment plan, robotic radical prostatectomy and BTLND  History of Present Illness      HPI:  Aung Coto is a 58 y o  male who presents with PSA of 14  Underwent a prostate biopsy results of which are below:  A  Prostate, left lateral base, core needle biopsy:             - Prostatic adenocarcinoma, Roland score 3 + 3 = 6, Prognostic Grade Group I, discontinuously involving approximately 50% of one core biopsy  - The diagnosis is supported by negative staining for basal cell markers (p63/), and positive luminal staining for p504s (prostate triple stain, performed with an appropriate control)      B  Prostate, left base, core needle biopsy:             - Benign prostate glands  - No malignancy is identified      C  Prostate, right base, core needle biopsy:             - Prostatic adenocarcinoma, Shae score 3 + 3 = 6, Prognostic Grade Group I, involving less than 5% of one core biopsy  - The diagnosis is supported by negative staining for basal cell markers (p63/), and positive luminal staining for p504s (prostate triple stain, performed with an appropriate control)      D  Prostate, right lateral base, core needle biopsy:             - Benign prostate glands  - No malignancy is identified      E  Prostate, left lateral mid, core needle biopsy:             - Prostatic adenocarcinoma, Roland score 3 + 3 = 6, Prognostic Grade Group I, discontinuously involving 90% of one core biopsy  - The diagnosis is supported by negative staining for basal cell markers (p63/), and positive luminal staining for p504s (prostate triple stain, performed with an appropriate control)      F   Prostate, left mid, core needle biopsy:             - Prostatic adenocarcinoma, Roland score 3 + 3 = 6, Prognostic Grade Group I, discontinuously involving 60% of one core biopsy  - The diagnosis is supported by negative staining for basal cell markers (p63/), and positive luminal staining for p504s (prostate triple stain, performed with an appropriate control)      G  Prostate, right mid, core needle biopsy:             - Prostatic adenocarcinoma, Benedict score 3 + 3 = 6, Prognostic Grade Group I, involving less than 5% of one core biopsy  - The diagnosis is supported by negative staining for basal cell markers (p63/), and positive luminal staining for p504s (prostate triple stain, performed with an appropriate control)       H  Prostate, right lateral mid, core needle biopsy:             - Prostatic adenocarcinoma, Shae score 3 + 3 = 6, Prognostic Grade Group I, involving 20% of one core biopsy  - The diagnosis is supported by negative staining for basal cell markers (p63/), and positive luminal staining for p504s (prostate triple stain, performed with an appropriate control)      I  Prostate, left lateral apex, core needle biopsy:             - Prostatic adenocarcinoma, Benedict score 3 + 4 = 7, Prognostic Grade Group II, involving 80% of one core biopsy              - Less than 5% Shae grade 4 prostatic adenocarcinoma  - The diagnosis is supported by negative staining for basal cell markers (p63/), and positive luminal staining for p504s (prostate triple stain, performed with an appropriate control)      J  Prostate, left apex, core needle biopsy:             - Prostatic adenocarcinoma, Benedict score 3 + 4 = 7, Prognostic Grade Group II, involving 90% of one core biopsy              - Less than 10% Shae grade 4 prostatic adenocarcinoma               - The diagnosis is supported by negative staining for basal cell markers (p63/), and positive luminal staining for p504s (prostate triple stain, performed with an appropriate control)      K  Prostate, right apex, core needle biopsy:             - Benign prostate glands  - No malignancy is identified      L  Prostate, right lateral apex, core needle biopsy:             - Prostatic adenocarcinoma, Shae score 3 + 3 = 6, Prognostic Grade Group I, involving approximately 5% of one core biopsy  - The diagnosis is supported by negative staining for basal cell markers (p63/), and positive luminal staining for p504s (prostate triple stain, performed with an appropriate control)  Review of Systems   Constitutional: Negative  HENT: Negative  Eyes: Negative  Respiratory: Negative  Cardiovascular: Negative  Gastrointestinal: Negative  Endocrine: Negative  Genitourinary: Positive for urgency  Musculoskeletal: Positive for arthralgias and back pain  Skin: Negative  Allergic/Immunologic: Negative  Neurological: Negative  Hematological: Negative  Psychiatric/Behavioral: Negative  Historical Information   Past Medical History:   Diagnosis Date    BPH with obstruction/lower urinary tract symptoms 2013    Elevated PSA 2013     Past Surgical History:   Procedure Laterality Date    PROSTATE BIOPSY  2018 2007  RT  INGUINAL HERNIA REPAIR LAPAROSCOPIC APPROACH WITH POSSIBLE MESH    Social History   History   Alcohol Use    1 2 oz/week    2 Cans of beer per week     Comment: socially     History   Drug Use No     History   Smoking Status    Former Smoker    Types: Cigarettes    Quit date: 2/1/1998   Smokeless Tobacco    Never Used     Family History:   Family History   Problem Relation Age of Onset    Heart disease Father        Meds/Allergies   all medications and allergies reviewed  Allergies   Allergen Reactions    Sulfa Antibiotics        Objective   Vitals: Blood pressure 116/68, height 5' 8" (1 727 m), weight 86 6 kg (191 lb)      Invasive Devices          No matching active lines, drains, or airways Physical Exam   Constitutional: He is oriented to person, place, and time  He appears well-developed  HENT:   Head: Normocephalic and atraumatic  Eyes: Conjunctivae and EOM are normal  Pupils are equal, round, and reactive to light  Neck: Normal range of motion  Neck supple  Cardiovascular: Normal rate, regular rhythm, normal heart sounds and intact distal pulses  Pulmonary/Chest: Effort normal and breath sounds normal    Abdominal: Soft  Bowel sounds are normal    Genitourinary: Rectum normal, prostate normal and penis normal    Genitourinary Comments: About 60 gms  Musculoskeletal: Normal range of motion  Neurological: He is alert and oriented to person, place, and time  Skin: Skin is warm and dry  Psychiatric: He has a normal mood and affect  His behavior is normal  Judgment and thought content normal        Lab Results: I have personally reviewed pertinent reports  Imaging: I have personally reviewed pertinent reports  EKG, Pathology, and Other Studies: I have personally reviewed pertinent reports  Counseling / Coordination of Care  Total floor / unit time spent today 80 minutes  Greater than 50% of total time was spent with the patient and / or family counseling and / or coordination of care    A description of the counseling / coordination of care:

## 2018-02-21 ENCOUNTER — TELEPHONE (OUTPATIENT)
Dept: UROLOGY | Facility: AMBULATORY SURGERY CENTER | Age: 63
End: 2018-02-21

## 2018-02-21 ENCOUNTER — TELEPHONE (OUTPATIENT)
Dept: UROLOGY | Facility: MEDICAL CENTER | Age: 63
End: 2018-02-21

## 2018-02-27 ENCOUNTER — ANESTHESIA EVENT (OUTPATIENT)
Dept: PERIOP | Facility: HOSPITAL | Age: 63
DRG: 708 | End: 2018-02-27
Payer: COMMERCIAL

## 2018-02-27 RX ORDER — SODIUM CHLORIDE 9 MG/ML
125 INJECTION, SOLUTION INTRAVENOUS CONTINUOUS
Status: CANCELLED | OUTPATIENT
Start: 2018-03-12

## 2018-02-28 ENCOUNTER — HOSPITAL ENCOUNTER (OUTPATIENT)
Dept: RADIOLOGY | Facility: HOSPITAL | Age: 63
Discharge: HOME | End: 2018-02-28
Attending: UROLOGY
Payer: COMMERCIAL

## 2018-02-28 ENCOUNTER — APPOINTMENT (OUTPATIENT)
Dept: PREADMISSION TESTING | Facility: HOSPITAL | Age: 63
End: 2018-02-28
Payer: COMMERCIAL

## 2018-02-28 ENCOUNTER — LAB (OUTPATIENT)
Dept: LAB | Facility: HOSPITAL | Age: 63
End: 2018-02-28
Attending: UROLOGY
Payer: COMMERCIAL

## 2018-02-28 ENCOUNTER — HOSPITAL ENCOUNTER (OUTPATIENT)
Dept: NON INVASIVE DIAGNOSTICS | Facility: HOSPITAL | Age: 63
Discharge: HOME | End: 2018-02-28
Attending: UROLOGY
Payer: COMMERCIAL

## 2018-02-28 DIAGNOSIS — C61 MALIGNANT NEOPLASM OF PROSTATE (HCC): ICD-10-CM

## 2018-02-28 LAB
ABO GROUP BLD: NORMAL
ALBUMIN SERPL BCP-MCNC: 3.9 G/DL (ref 3.5–5)
ALP SERPL-CCNC: 70 U/L (ref 46–116)
ALT SERPL W P-5'-P-CCNC: 33 U/L (ref 12–78)
ANION GAP SERPL CALCULATED.3IONS-SCNC: 11 MMOL/L (ref 4–13)
APTT PPP: 35 SECONDS (ref 23–35)
AST SERPL W P-5'-P-CCNC: 20 U/L (ref 5–45)
BASOPHILS # BLD AUTO: 0.01 THOUSANDS/ΜL (ref 0–0.1)
BASOPHILS NFR BLD AUTO: 0 % (ref 0–1)
BILIRUB SERPL-MCNC: 0.71 MG/DL (ref 0.2–1)
BLD GP AB SCN SERPL QL: NEGATIVE
BUN SERPL-MCNC: 14 MG/DL (ref 5–25)
CALCIUM SERPL-MCNC: 8.6 MG/DL (ref 8.3–10.1)
CHLORIDE SERPL-SCNC: 103 MMOL/L (ref 100–108)
CO2 SERPL-SCNC: 24 MMOL/L (ref 21–32)
CREAT SERPL-MCNC: 0.94 MG/DL (ref 0.6–1.3)
EOSINOPHIL # BLD AUTO: 0.06 THOUSAND/ΜL (ref 0–0.61)
EOSINOPHIL NFR BLD AUTO: 1 % (ref 0–6)
ERYTHROCYTE [DISTWIDTH] IN BLOOD BY AUTOMATED COUNT: 12.4 % (ref 11.6–15.1)
GFR SERPL CREATININE-BSD FRML MDRD: 87 ML/MIN/1.73SQ M
GLUCOSE SERPL-MCNC: 86 MG/DL (ref 65–140)
HCT VFR BLD AUTO: 46.3 % (ref 36.5–49.3)
HGB BLD-MCNC: 16.6 G/DL (ref 12–17)
INR PPP: 0.97 (ref 0.86–1.16)
LYMPHOCYTES # BLD AUTO: 1.46 THOUSANDS/ΜL (ref 0.6–4.47)
LYMPHOCYTES NFR BLD AUTO: 30 % (ref 14–44)
MCH RBC QN AUTO: 32 PG (ref 26.8–34.3)
MCHC RBC AUTO-ENTMCNC: 35.9 G/DL (ref 31.4–37.4)
MCV RBC AUTO: 89 FL (ref 82–98)
MONOCYTES # BLD AUTO: 0.36 THOUSAND/ΜL (ref 0.17–1.22)
MONOCYTES NFR BLD AUTO: 7 % (ref 4–12)
NEUTROPHILS # BLD AUTO: 3.02 THOUSANDS/ΜL (ref 1.85–7.62)
NEUTS SEG NFR BLD AUTO: 62 % (ref 43–75)
NRBC BLD AUTO-RTO: 0 /100 WBCS
PLATELET # BLD AUTO: 222 THOUSANDS/UL (ref 149–390)
PMV BLD AUTO: 11.5 FL (ref 8.9–12.7)
POTASSIUM SERPL-SCNC: 4.1 MMOL/L (ref 3.5–5.3)
PROT SERPL-MCNC: 7.7 G/DL (ref 6.4–8.2)
PROTHROMBIN TIME: 12.9 SECONDS (ref 12.1–14.4)
RBC # BLD AUTO: 5.19 MILLION/UL (ref 3.88–5.62)
RH BLD: POSITIVE
SODIUM SERPL-SCNC: 138 MMOL/L (ref 136–145)
SPECIMEN EXPIRATION DATE: NORMAL
WBC # BLD AUTO: 4.91 THOUSAND/UL (ref 4.31–10.16)

## 2018-02-28 PROCEDURE — 93005 ELECTROCARDIOGRAM TRACING: CPT | Performed by: UROLOGY

## 2018-02-28 PROCEDURE — 71046 X-RAY EXAM CHEST 2 VIEWS: CPT

## 2018-02-28 PROCEDURE — 85025 COMPLETE CBC W/AUTO DIFF WBC: CPT

## 2018-02-28 PROCEDURE — 86900 BLOOD TYPING SEROLOGIC ABO: CPT

## 2018-02-28 PROCEDURE — 86901 BLOOD TYPING SEROLOGIC RH(D): CPT

## 2018-02-28 PROCEDURE — 85610 PROTHROMBIN TIME: CPT

## 2018-02-28 PROCEDURE — 80053 COMPREHEN METABOLIC PANEL: CPT

## 2018-02-28 PROCEDURE — 81003 URINALYSIS AUTO W/O SCOPE: CPT

## 2018-02-28 PROCEDURE — 36415 COLL VENOUS BLD VENIPUNCTURE: CPT

## 2018-02-28 PROCEDURE — 86850 RBC ANTIBODY SCREEN: CPT

## 2018-02-28 PROCEDURE — 93010 ELECTROCARDIOGRAM REPORT: CPT | Performed by: INTERNAL MEDICINE

## 2018-02-28 PROCEDURE — 85730 THROMBOPLASTIN TIME PARTIAL: CPT

## 2018-02-28 NOTE — ANESTHESIA PREPROCEDURE EVALUATION
Review of Systems/Medical History  Patient summary reviewed  Chart reviewed      Cardiovascular  Negative cardio ROS    Pulmonary  Smoker ex-smoker  ,        GI/Hepatic  Negative GI/hepatic ROS          Prostatic disorder, history of prostate cancer       Endo/Other  Negative endo/other ROS      GYN       Hematology  Negative hematology ROS      Musculoskeletal  Negative musculoskeletal ROS        Neurology  Negative neurology ROS      Psychology   Negative psychology ROS              Physical Exam    Airway    Mallampati score: II         Dental   No notable dental hx     Cardiovascular  Comment: Negative ROS, Rhythm: regular, Rate: normal, Cardiovascular exam normal    Pulmonary  Pulmonary exam normal     Other Findings        Anesthesia Plan  ASA Score- 2     Anesthesia Type- general with ASA Monitors  Additional Monitors:   Airway Plan: ETT  Plan Factors-Patient not instructed to abstain from smoking on day of procedure  Patient did not smoke on day of surgery  Induction- intravenous  Postoperative Plan- Plan for postoperative opioid use  Informed Consent- Anesthetic plan and risks discussed with patient

## 2018-03-01 ENCOUNTER — TELEPHONE (OUTPATIENT)
Dept: UROLOGY | Facility: MEDICAL CENTER | Age: 63
End: 2018-03-01

## 2018-03-01 ENCOUNTER — PROCEDURE VISIT (OUTPATIENT)
Dept: UROLOGY | Facility: MEDICAL CENTER | Age: 63
End: 2018-03-01
Payer: COMMERCIAL

## 2018-03-01 DIAGNOSIS — C61 PROSTATE CANCER (HCC): Primary | ICD-10-CM

## 2018-03-01 DIAGNOSIS — C61 MALIGNANT NEOPLASM OF PROSTATE (HCC): ICD-10-CM

## 2018-03-01 LAB
ATRIAL RATE: 59 BPM
P AXIS: 53 DEGREES
PR INTERVAL: 158 MS
QRS AXIS: 21 DEGREES
QRSD INTERVAL: 88 MS
QT INTERVAL: 390 MS
QTC INTERVAL: 386 MS
T WAVE AXIS: 46 DEGREES
VENTRICULAR RATE: 59 BPM

## 2018-03-01 PROCEDURE — 99211 OFF/OP EST MAY X REQ PHY/QHP: CPT

## 2018-03-01 NOTE — PROGRESS NOTES
PRE-OP PROSTATECTOMY EDUCATION    THE FOLLOWING AREAS WERE DISCUSSED WITH PT AND HE VERBALIZED UNDERSTANDING OF THE INSTRUCTIONS:    MEDICATIONS INCLUDING BLOOD THINNERS: YES    BOWEL PREP: YES    CATHETER DEMO: YES    ELIMINATION: YES    RESTRICTIONS:          SITTING YES          WALKING: YES          DRIVING: YES          LIFTING: YES          MISCELLANEOUS: YES          SHOWERING: YES    BLOOD CLOTS: YES    J/P DRAIN: YES    DIET: YES    FLUIDS: YES    KEGEL EXERCISES: YES

## 2018-03-01 NOTE — PROGRESS NOTES
Spoke to Jessica Oviedo about my recent discussion concerning his prior inguinal hernia repair with his general surgeon Dr Claudia Soares, who performed that surgery on him about 10 years ago  There is a likelihood that there was mesh placed at the time of his laparoscopic right inguinal hernia repair  As noted, I thoroughly discussed, with the patient and his wife at his surgical consultation with me, that there is the possibility the mesh would impede our progress in performing the laparoscopic robotic approach for his prostatectomy, and thereby it may be necessary for us to convert to a traditional open retropubic approach  They understand this quite well and are willing to proceed, accordingly

## 2018-03-02 ENCOUNTER — TELEPHONE (OUTPATIENT)
Dept: UROLOGY | Facility: MEDICAL CENTER | Age: 63
End: 2018-03-02

## 2018-03-02 NOTE — TELEPHONE ENCOUNTER
[employer] Disability form and FMLA Source form filled out, faxed and patient picked up 3/2/18

## 2018-03-08 ENCOUNTER — TELEPHONE (OUTPATIENT)
Dept: UROLOGY | Facility: AMBULATORY SURGERY CENTER | Age: 63
End: 2018-03-08

## 2018-03-08 NOTE — TELEPHONE ENCOUNTER
Spoke with pt and answered all his questions  One additional question he has for Dr Avni Win is when he determines when he can do nerve-sparing surgery  Sending to Dr Avni Win for an answer, then asking nursing to call him with the answer to his question

## 2018-03-08 NOTE — TELEPHONE ENCOUNTER
Tell patient the determination, as to whether a nerve-sparing operation can be performed, is assessed intraoperatively once the prostate has been exposed

## 2018-03-08 NOTE — TELEPHONE ENCOUNTER
Spoke with wife who states pt is working until [facility]  He will call back tomorrow for answer

## 2018-03-09 ENCOUNTER — TELEPHONE (OUTPATIENT)
Dept: UROLOGY | Facility: MEDICAL CENTER | Age: 63
End: 2018-03-09

## 2018-03-12 ENCOUNTER — HOSPITAL ENCOUNTER (INPATIENT)
Facility: HOSPITAL | Age: 63
LOS: 1 days | Discharge: HOME | DRG: 708 | End: 2018-03-13
Attending: UROLOGY | Admitting: UROLOGY
Payer: COMMERCIAL

## 2018-03-12 ENCOUNTER — ANESTHESIA (OUTPATIENT)
Dept: PERIOP | Facility: HOSPITAL | Age: 63
DRG: 708 | End: 2018-03-12
Payer: COMMERCIAL

## 2018-03-12 DIAGNOSIS — C61 MALIGNANT NEOPLASM OF PROSTATE (HCC): Primary | ICD-10-CM

## 2018-03-12 PROCEDURE — 0VT04ZZ RESECTION OF PROSTATE, PERCUTANEOUS ENDOSCOPIC APPROACH: ICD-10-PCS | Performed by: UROLOGY

## 2018-03-12 PROCEDURE — 55866 LAPS SURG PRST8ECT RPBIC RAD: CPT | Performed by: UROLOGY

## 2018-03-12 PROCEDURE — 88309 TISSUE EXAM BY PATHOLOGIST: CPT | Performed by: PATHOLOGY

## 2018-03-12 PROCEDURE — 88307 TISSUE EXAM BY PATHOLOGIST: CPT | Performed by: PATHOLOGY

## 2018-03-12 PROCEDURE — 38571 LAPAROSCOPY LYMPHADENECTOMY: CPT | Performed by: PHYSICIAN ASSISTANT

## 2018-03-12 PROCEDURE — 38571 LAPAROSCOPY LYMPHADENECTOMY: CPT | Performed by: UROLOGY

## 2018-03-12 PROCEDURE — 07BC4ZZ EXCISION OF PELVIS LYMPHATIC, PERCUTANEOUS ENDOSCOPIC APPROACH: ICD-10-PCS | Performed by: UROLOGY

## 2018-03-12 PROCEDURE — 8E0W4CZ ROBOTIC ASSISTED PROCEDURE OF TRUNK REGION, PERCUTANEOUS ENDOSCOPIC APPROACH: ICD-10-PCS | Performed by: UROLOGY

## 2018-03-12 PROCEDURE — 55866 LAPS SURG PRST8ECT RPBIC RAD: CPT | Performed by: PHYSICIAN ASSISTANT

## 2018-03-12 RX ORDER — HYDROMORPHONE HYDROCHLORIDE 2 MG/ML
INJECTION, SOLUTION INTRAMUSCULAR; INTRAVENOUS; SUBCUTANEOUS AS NEEDED
Status: DISCONTINUED | OUTPATIENT
Start: 2018-03-12 | End: 2018-03-12 | Stop reason: SURG

## 2018-03-12 RX ORDER — ONDANSETRON 2 MG/ML
4 INJECTION INTRAMUSCULAR; INTRAVENOUS ONCE AS NEEDED
Status: DISCONTINUED | OUTPATIENT
Start: 2018-03-12 | End: 2018-03-12 | Stop reason: HOSPADM

## 2018-03-12 RX ORDER — OXYCODONE HYDROCHLORIDE AND ACETAMINOPHEN 5; 325 MG/1; MG/1
1 TABLET ORAL EVERY 4 HOURS PRN
Status: DISCONTINUED | OUTPATIENT
Start: 2018-03-12 | End: 2018-03-13 | Stop reason: HOSPADM

## 2018-03-12 RX ORDER — METOCLOPRAMIDE HYDROCHLORIDE 5 MG/ML
10 INJECTION INTRAMUSCULAR; INTRAVENOUS EVERY 6 HOURS SCHEDULED
Status: DISCONTINUED | OUTPATIENT
Start: 2018-03-12 | End: 2018-03-13 | Stop reason: HOSPADM

## 2018-03-12 RX ORDER — KETOROLAC TROMETHAMINE 30 MG/ML
30 INJECTION, SOLUTION INTRAMUSCULAR; INTRAVENOUS EVERY 6 HOURS SCHEDULED
Status: DISCONTINUED | OUTPATIENT
Start: 2018-03-12 | End: 2018-03-13 | Stop reason: HOSPADM

## 2018-03-12 RX ORDER — ACETAMINOPHEN 325 MG/1
650 TABLET ORAL EVERY 6 HOURS PRN
Status: DISCONTINUED | OUTPATIENT
Start: 2018-03-12 | End: 2018-03-13 | Stop reason: HOSPADM

## 2018-03-12 RX ORDER — VECURONIUM BROMIDE 1 MG/ML
INJECTION, POWDER, LYOPHILIZED, FOR SOLUTION INTRAVENOUS AS NEEDED
Status: DISCONTINUED | OUTPATIENT
Start: 2018-03-12 | End: 2018-03-12 | Stop reason: SURG

## 2018-03-12 RX ORDER — GLYCOPYRROLATE 0.2 MG/ML
INJECTION INTRAMUSCULAR; INTRAVENOUS AS NEEDED
Status: DISCONTINUED | OUTPATIENT
Start: 2018-03-12 | End: 2018-03-12 | Stop reason: SURG

## 2018-03-12 RX ORDER — SODIUM CHLORIDE, SODIUM LACTATE, POTASSIUM CHLORIDE, CALCIUM CHLORIDE 600; 310; 30; 20 MG/100ML; MG/100ML; MG/100ML; MG/100ML
125 INJECTION, SOLUTION INTRAVENOUS CONTINUOUS
Status: DISCONTINUED | OUTPATIENT
Start: 2018-03-12 | End: 2018-03-13

## 2018-03-12 RX ORDER — ONDANSETRON 2 MG/ML
INJECTION INTRAMUSCULAR; INTRAVENOUS AS NEEDED
Status: DISCONTINUED | OUTPATIENT
Start: 2018-03-12 | End: 2018-03-12 | Stop reason: SURG

## 2018-03-12 RX ORDER — OXYBUTYNIN CHLORIDE 5 MG/1
5 TABLET ORAL EVERY 6 HOURS PRN
Qty: 30 TABLET | Refills: 0 | Status: SHIPPED | OUTPATIENT
Start: 2018-03-12 | End: 2019-10-04 | Stop reason: ALTCHOICE

## 2018-03-12 RX ORDER — BACITRACIN, NEOMYCIN, POLYMYXIN B 400; 3.5; 5 [USP'U]/G; MG/G; [USP'U]/G
1 OINTMENT TOPICAL 2 TIMES DAILY
Status: DISCONTINUED | OUTPATIENT
Start: 2018-03-12 | End: 2018-03-13 | Stop reason: HOSPADM

## 2018-03-12 RX ORDER — DOCUSATE SODIUM 100 MG/1
100 CAPSULE, LIQUID FILLED ORAL 2 TIMES DAILY
Status: DISCONTINUED | OUTPATIENT
Start: 2018-03-12 | End: 2018-03-13 | Stop reason: HOSPADM

## 2018-03-12 RX ORDER — FAMOTIDINE 20 MG/1
20 TABLET, FILM COATED ORAL
Status: DISCONTINUED | OUTPATIENT
Start: 2018-03-12 | End: 2018-03-13 | Stop reason: HOSPADM

## 2018-03-12 RX ORDER — OXYBUTYNIN CHLORIDE 5 MG/1
5 TABLET ORAL 4 TIMES DAILY PRN
Status: DISCONTINUED | OUTPATIENT
Start: 2018-03-12 | End: 2018-03-13 | Stop reason: HOSPADM

## 2018-03-12 RX ORDER — CIPROFLOXACIN 250 MG/1
250 TABLET, FILM COATED ORAL EVERY 12 HOURS SCHEDULED
Qty: 14 TABLET | Refills: 0 | Status: SHIPPED | OUTPATIENT
Start: 2018-03-12 | End: 2018-03-19

## 2018-03-12 RX ORDER — PROPOFOL 10 MG/ML
INJECTION, EMULSION INTRAVENOUS AS NEEDED
Status: DISCONTINUED | OUTPATIENT
Start: 2018-03-12 | End: 2018-03-12 | Stop reason: SURG

## 2018-03-12 RX ORDER — EPHEDRINE SULFATE 50 MG/ML
INJECTION, SOLUTION INTRAVENOUS AS NEEDED
Status: DISCONTINUED | OUTPATIENT
Start: 2018-03-12 | End: 2018-03-12 | Stop reason: SURG

## 2018-03-12 RX ORDER — SIMETHICONE 80 MG
80 TABLET,CHEWABLE ORAL EVERY 6 HOURS PRN
Status: DISCONTINUED | OUTPATIENT
Start: 2018-03-12 | End: 2018-03-13 | Stop reason: HOSPADM

## 2018-03-12 RX ORDER — HEPARIN SODIUM 5000 [USP'U]/ML
5000 INJECTION, SOLUTION INTRAVENOUS; SUBCUTANEOUS ONCE
Status: COMPLETED | OUTPATIENT
Start: 2018-03-12 | End: 2018-03-12

## 2018-03-12 RX ORDER — SODIUM CHLORIDE 9 MG/ML
INJECTION, SOLUTION INTRAVENOUS CONTINUOUS PRN
Status: DISCONTINUED | OUTPATIENT
Start: 2018-03-12 | End: 2018-03-12 | Stop reason: SURG

## 2018-03-12 RX ORDER — ONDANSETRON 2 MG/ML
4 INJECTION INTRAMUSCULAR; INTRAVENOUS EVERY 6 HOURS PRN
Status: DISCONTINUED | OUTPATIENT
Start: 2018-03-12 | End: 2018-03-13 | Stop reason: HOSPADM

## 2018-03-12 RX ORDER — FENTANYL CITRATE 50 UG/ML
INJECTION, SOLUTION INTRAMUSCULAR; INTRAVENOUS AS NEEDED
Status: DISCONTINUED | OUTPATIENT
Start: 2018-03-12 | End: 2018-03-12 | Stop reason: SURG

## 2018-03-12 RX ORDER — DEXAMETHASONE SODIUM PHOSPHATE 4 MG/ML
INJECTION, SOLUTION INTRA-ARTICULAR; INTRALESIONAL; INTRAMUSCULAR; INTRAVENOUS; SOFT TISSUE AS NEEDED
Status: DISCONTINUED | OUTPATIENT
Start: 2018-03-12 | End: 2018-03-12 | Stop reason: SURG

## 2018-03-12 RX ORDER — HEPARIN SODIUM 5000 [USP'U]/ML
5000 INJECTION, SOLUTION INTRAVENOUS; SUBCUTANEOUS EVERY 8 HOURS SCHEDULED
Status: DISCONTINUED | OUTPATIENT
Start: 2018-03-12 | End: 2018-03-13 | Stop reason: HOSPADM

## 2018-03-12 RX ORDER — SODIUM CHLORIDE 9 MG/ML
125 INJECTION, SOLUTION INTRAVENOUS CONTINUOUS
Status: DISCONTINUED | OUTPATIENT
Start: 2018-03-12 | End: 2018-03-12

## 2018-03-12 RX ORDER — BUPIVACAINE HYDROCHLORIDE 5 MG/ML
INJECTION, SOLUTION PERINEURAL AS NEEDED
Status: DISCONTINUED | OUTPATIENT
Start: 2018-03-12 | End: 2018-03-12 | Stop reason: HOSPADM

## 2018-03-12 RX ORDER — OXYCODONE HYDROCHLORIDE AND ACETAMINOPHEN 5; 325 MG/1; MG/1
1 TABLET ORAL EVERY 4 HOURS PRN
Qty: 20 TABLET | Refills: 0 | Status: SHIPPED | OUTPATIENT
Start: 2018-03-12 | End: 2018-03-27 | Stop reason: ALTCHOICE

## 2018-03-12 RX ORDER — KETOROLAC TROMETHAMINE 30 MG/ML
INJECTION, SOLUTION INTRAMUSCULAR; INTRAVENOUS AS NEEDED
Status: DISCONTINUED | OUTPATIENT
Start: 2018-03-12 | End: 2018-03-12 | Stop reason: SURG

## 2018-03-12 RX ORDER — MIDAZOLAM HYDROCHLORIDE 1 MG/ML
INJECTION INTRAMUSCULAR; INTRAVENOUS AS NEEDED
Status: DISCONTINUED | OUTPATIENT
Start: 2018-03-12 | End: 2018-03-12 | Stop reason: SURG

## 2018-03-12 RX ORDER — METOCLOPRAMIDE HYDROCHLORIDE 5 MG/ML
10 INJECTION INTRAMUSCULAR; INTRAVENOUS EVERY 6 HOURS PRN
Status: DISCONTINUED | OUTPATIENT
Start: 2018-03-12 | End: 2018-03-12

## 2018-03-12 RX ORDER — CIPROFLOXACIN 250 MG/1
250 TABLET, FILM COATED ORAL EVERY 12 HOURS SCHEDULED
Status: DISCONTINUED | OUTPATIENT
Start: 2018-03-13 | End: 2018-03-13 | Stop reason: HOSPADM

## 2018-03-12 RX ADMIN — SODIUM CHLORIDE 500 ML: 0.9 INJECTION, SOLUTION INTRAVENOUS at 13:29

## 2018-03-12 RX ADMIN — DOCUSATE SODIUM 100 MG: 100 CAPSULE, LIQUID FILLED ORAL at 17:04

## 2018-03-12 RX ADMIN — METOCLOPRAMIDE 10 MG: 5 INJECTION, SOLUTION INTRAMUSCULAR; INTRAVENOUS at 23:37

## 2018-03-12 RX ADMIN — BACITRACIN, NEOMYCIN, POLYMYXIN B 1 SMALL APPLICATION: 400; 3.5; 5 OINTMENT TOPICAL at 17:06

## 2018-03-12 RX ADMIN — HYDROMORPHONE HYDROCHLORIDE 0.5 MG: 2 INJECTION, SOLUTION INTRAMUSCULAR; INTRAVENOUS; SUBCUTANEOUS at 09:40

## 2018-03-12 RX ADMIN — VECURONIUM BROMIDE 1 MG: 1 INJECTION, POWDER, LYOPHILIZED, FOR SOLUTION INTRAVENOUS at 09:46

## 2018-03-12 RX ADMIN — VECURONIUM BROMIDE 1 MG: 1 INJECTION, POWDER, LYOPHILIZED, FOR SOLUTION INTRAVENOUS at 08:19

## 2018-03-12 RX ADMIN — VECURONIUM BROMIDE 7 MG: 1 INJECTION, POWDER, LYOPHILIZED, FOR SOLUTION INTRAVENOUS at 07:24

## 2018-03-12 RX ADMIN — METOCLOPRAMIDE 10 MG: 5 INJECTION, SOLUTION INTRAMUSCULAR; INTRAVENOUS at 13:25

## 2018-03-12 RX ADMIN — HEPARIN SODIUM 5000 UNITS: 5000 INJECTION, SOLUTION INTRAVENOUS; SUBCUTANEOUS at 13:25

## 2018-03-12 RX ADMIN — PROPOFOL 200 MG: 10 INJECTION, EMULSION INTRAVENOUS at 07:24

## 2018-03-12 RX ADMIN — FENTANYL CITRATE 50 MCG: 50 INJECTION, SOLUTION INTRAMUSCULAR; INTRAVENOUS at 08:44

## 2018-03-12 RX ADMIN — DEXAMETHASONE SODIUM PHOSPHATE 4 MG: 4 INJECTION, SOLUTION INTRAMUSCULAR; INTRAVENOUS at 07:40

## 2018-03-12 RX ADMIN — KETOROLAC TROMETHAMINE 30 MG: 30 INJECTION, SOLUTION INTRAMUSCULAR at 13:29

## 2018-03-12 RX ADMIN — METOCLOPRAMIDE 10 MG: 5 INJECTION, SOLUTION INTRAMUSCULAR; INTRAVENOUS at 17:11

## 2018-03-12 RX ADMIN — BACITRACIN, NEOMYCIN, POLYMYXIN B 1 SMALL APPLICATION: 400; 3.5; 5 OINTMENT TOPICAL at 13:25

## 2018-03-12 RX ADMIN — SODIUM CHLORIDE 500 ML: 0.9 INJECTION, SOLUTION INTRAVENOUS at 11:30

## 2018-03-12 RX ADMIN — VECURONIUM BROMIDE 1 MG: 1 INJECTION, POWDER, LYOPHILIZED, FOR SOLUTION INTRAVENOUS at 08:55

## 2018-03-12 RX ADMIN — FENTANYL CITRATE 100 MCG: 50 INJECTION, SOLUTION INTRAMUSCULAR; INTRAVENOUS at 07:24

## 2018-03-12 RX ADMIN — ONDANSETRON HYDROCHLORIDE 4 MG: 2 INJECTION, SOLUTION INTRAVENOUS at 07:15

## 2018-03-12 RX ADMIN — SODIUM CHLORIDE, SODIUM LACTATE, POTASSIUM CHLORIDE, AND CALCIUM CHLORIDE 125 ML/HR: .6; .31; .03; .02 INJECTION, SOLUTION INTRAVENOUS at 14:43

## 2018-03-12 RX ADMIN — EPHEDRINE SULFATE 10 MG: 50 INJECTION, SOLUTION INTRAMUSCULAR; INTRAVENOUS; SUBCUTANEOUS at 08:13

## 2018-03-12 RX ADMIN — KETOROLAC TROMETHAMINE 30 MG: 30 INJECTION, SOLUTION INTRAMUSCULAR at 23:36

## 2018-03-12 RX ADMIN — SODIUM CHLORIDE 125 ML/HR: 0.9 INJECTION, SOLUTION INTRAVENOUS at 06:31

## 2018-03-12 RX ADMIN — HYDROMORPHONE HYDROCHLORIDE 0.5 MG: 2 INJECTION, SOLUTION INTRAMUSCULAR; INTRAVENOUS; SUBCUTANEOUS at 10:21

## 2018-03-12 RX ADMIN — DOCUSATE SODIUM 100 MG: 100 CAPSULE, LIQUID FILLED ORAL at 13:24

## 2018-03-12 RX ADMIN — FENTANYL CITRATE 50 MCG: 50 INJECTION, SOLUTION INTRAMUSCULAR; INTRAVENOUS at 07:56

## 2018-03-12 RX ADMIN — CEFAZOLIN SODIUM 2000 MG: 2 SOLUTION INTRAVENOUS at 07:39

## 2018-03-12 RX ADMIN — OXYBUTYNIN CHLORIDE 5 MG: 5 TABLET ORAL at 13:25

## 2018-03-12 RX ADMIN — SODIUM CHLORIDE: 0.9 INJECTION, SOLUTION INTRAVENOUS at 07:30

## 2018-03-12 RX ADMIN — HYDROMORPHONE HYDROCHLORIDE 0.5 MG: 2 INJECTION, SOLUTION INTRAMUSCULAR; INTRAVENOUS; SUBCUTANEOUS at 08:56

## 2018-03-12 RX ADMIN — VECURONIUM BROMIDE 1 MG: 1 INJECTION, POWDER, LYOPHILIZED, FOR SOLUTION INTRAVENOUS at 09:15

## 2018-03-12 RX ADMIN — MIDAZOLAM HYDROCHLORIDE 4 MG: 1 INJECTION, SOLUTION INTRAMUSCULAR; INTRAVENOUS at 07:15

## 2018-03-12 RX ADMIN — HEPARIN SODIUM 5000 UNITS: 5000 INJECTION, SOLUTION INTRAVENOUS; SUBCUTANEOUS at 21:36

## 2018-03-12 RX ADMIN — FAMOTIDINE 20 MG: 20 TABLET, FILM COATED ORAL at 17:04

## 2018-03-12 RX ADMIN — KETOROLAC TROMETHAMINE 30 MG: 30 INJECTION, SOLUTION INTRAMUSCULAR at 18:49

## 2018-03-12 RX ADMIN — GLYCOPYRROLATE 0.6 MG: 0.2 INJECTION, SOLUTION INTRAMUSCULAR; INTRAVENOUS at 10:56

## 2018-03-12 RX ADMIN — HEPARIN SODIUM 5000 UNITS: 5000 INJECTION, SOLUTION INTRAVENOUS; SUBCUTANEOUS at 06:42

## 2018-03-12 RX ADMIN — SODIUM CHLORIDE: 0.9 INJECTION, SOLUTION INTRAVENOUS at 10:15

## 2018-03-12 RX ADMIN — CEFAZOLIN SODIUM 1000 MG: 1 SOLUTION INTRAVENOUS at 21:52

## 2018-03-12 RX ADMIN — SODIUM CHLORIDE, SODIUM LACTATE, POTASSIUM CHLORIDE, AND CALCIUM CHLORIDE 125 ML/HR: .6; .31; .03; .02 INJECTION, SOLUTION INTRAVENOUS at 23:40

## 2018-03-12 RX ADMIN — NEOSTIGMINE METHYLSULFATE 3 MG: 1 INJECTION, SOLUTION INTRAMUSCULAR; INTRAVENOUS; SUBCUTANEOUS at 10:56

## 2018-03-12 RX ADMIN — CEFAZOLIN SODIUM 1000 MG: 1 SOLUTION INTRAVENOUS at 14:43

## 2018-03-12 RX ADMIN — OXYCODONE HYDROCHLORIDE AND ACETAMINOPHEN 1 TABLET: 5; 325 TABLET ORAL at 17:05

## 2018-03-12 RX ADMIN — LIDOCAINE HYDROCHLORIDE 100 MG: 20 INJECTION, SOLUTION INTRAVENOUS at 07:24

## 2018-03-12 RX ADMIN — KETOROLAC TROMETHAMINE 30 MG: 30 INJECTION, SOLUTION INTRAMUSCULAR at 10:43

## 2018-03-12 NOTE — OP NOTE
OPERATIVE REPORT  PATIENT NAME: Melanie Bernard    :  1955  MRN: [id number]  Pt Location: [facility] OR ROOM 06    SURGERY DATE: 3/12/2018    Surgeon(s) and Role:      Freddy Hernandes MD FACS - Primary     * Pearl Mtz PA-C - Assisting  *The physician assistant was medically necessary and integral during the entire procedure to help maintain retraction irrigation suction an instrument insertion during the procedure  Preop Diagnosis:  Malignant neoplasm of prostate ([address] ) [C61]    Post-Op Diagnosis Codes:     * Malignant neoplasm of prostate ([address] ) [C61]    Procedure(s) (LRB):  LAPAROSCOPIC ROBOTIC RADICAL PROSTATECTOMY, BILATERAL PELVIC LYMPH NODE DISSECTION (N/A)    Specimen(s):  ID Type Source Tests Collected by Time Destination   1 :  Tissue Prostate TISSUE EXAM Freddy Hernandes MD 3/12/2018 1043    2 : BILATERAL PELVIC LYMPH NODES Tissue Lymph Node TISSUE EXAM Freddy Hernandes MD 3/12/2018 1043        Estimated Blood Loss:   50 mL    Drains:   MANAV drain, 20 [place] Peck catheter    Anesthesia Type:   General    Operative Indications:  Malignant neoplasm of prostate ([address] ) [U70]    Complications:   None    Procedure and Technique:  After in formed consent including risks of bleeding, infection, bowel/vascular injury, urinary incontinence, impotence, disease recurrence and need for secondary procedures, patient was properly identified and placed supine in the operating room theater  General anesthesia was administered  He was then placed in the low dorsal lithotomy position with arms tucked at the side care to pad all contact points  Sterilely prepped and draped in usual fashion       A #20 [place] Peck catheter was placed   0 5% marcaine was used to infiltrate the skin of all planned cannula/port sites   A 12 mm aleta-umbilical incision was made with a Bovie   A Veress needle was introduced and the abdomen insufflated to 15 mm Hg   A 12 mm bladed trocar was used to insert the 12 mm camera cannula without any difficulty   Laparoscopy was performed showing no injury to any intra-abdominal structures    There were no significant intra-abdominal adhesions  The remaining trochars were placed at this time  Two 8 mm trochars were placed lateral to the umbilicus in the midclavicular line bilaterally  A 8 mm trochar was placed above the left iliac crest  A 12 mm trocar was placed above the right iliac crest  The patient was placed in steep Trendelenburg position, and the robot tower was then docked  All robotic instruments were then placed under direct visual guidance   A mesh plug was noted at the right internal ring, likely from a previous laparoscopic herniorrhaphy, but it did not directly impede our technical progress for the prostatectomy or the lymph node dissection      Surgical dissection was initiated by mobilizing the bladder, which was sharply, but mostly bluntly mobilized from its anterior peritoneal attachments and posterior rectus fascia without issue   The space of Retzius was entered and fibrofatty tissue overlying the prostate was swept in the cephalad direction   Bilateral endopelvic fascia were incised, working from the base of the prostate toward the apex  With the apex reached, the puboprostatic ligaments were appreciated and the partially divided to access the dorsal venous complex   That was oversewn with an 0-Vicryl ligature in a slip knot fashion      The prostatovesical junction was placed on traction with the 3rd robotic arm Cobra grasper   It was divided sharply with electrocautery in a bladder neck sparing approach  The pereyra catheter seen, and was then withdrawn and reflected anteriorly on traction to expose the posterior bladder neck   This posterior bladder neck was incised in a similar manner, with care to not involve the ureteral orifices, which were observed bilaterally   Dissection proceeded distally further until the seminal vesicles and vasa were accessed in their respective recesses   The vasa were transected and the seminal vesicles dissected out in their entirety, and Denonvilliers fascia was transversed   The posterior aspect of the prostate was bluntly mobilized further off the anterior rectal wall, without difficulty, in the midline, up to the prostatic apex      The neurovascular bundles (NVB) on the right and left were spared and reflected off and away from the prostatic vascular pedicle from the base of the prostate to it's apex   Care was taken to not violate the prostate capsule near the bilateral apical regions   Once the prostatic vascular pedicles were isolated away from the NVB's, they were sequentially avascularly divided with a self contained energy Endoseal device   This accomplished complete mobilization of the prostate and seminal vesicle specimen, with the exception of it's attachment to the dorsal venous complex and urethra        A second dorsal venous complex 0- vicryl ligature was placed, and it and the urethra were then sharply transected, detaching the entire specimen  The operative site was liberally irrigated, and inspected for excellent hemostasis   There was good preservation of the NVB's and no inadvertent defect or injury seen in the anterior rectal wall       The specimen was temporarily set in it's fossa, and bilateral pelvic lymph node dissections were performed employing the standard boundaries of such   Care was taken to not traumatize the obturator nerves   All specimens were placed within an Endo-Catch bag, and moved away from the operative site      The bladder neck was noted to have a slightly enlarged aperture, requiring 2 laterally placed 2-0 Vicryl tapering stitches   The vesicourethral anastomosis was performed in the standard tension-free running 360 degree, suturing fashion using a double-armed 3-0 Monocryl suture    A second 20 Fr   Peck was placed past the anastomosis by the assistant without difficulty, and liberally irrigated showing no evidence of extravasation        The specimen bag was brought towards the midline  A #10 flat MANAV drain was placed perivesically, and the robot tower was undocked   The patient was taken out of Trendelenburg position, the abdomen was deinsufflated, and all trocars were removed  The MANAV drain tubing, that had been brought through a robot cannula site, was anchored to the skin with 2-0 nylon suture   The midline camera cannula fascial incision site was the extended slightly to facilitate delivery of the specimen bag   That fascia was closed with a running 0-vicryl suture   All skin incision edges were opposed and closed with subcuticular 4-0 Monocryl, and then Histacryl      Lap, sponge and needle count was correct x 2   The patient awakened from anesthesia having tolerated the procedure well and taken to PACU in stable condition      Patient Disposition:  PACU     SIGNATURE: Ofe Quevedo MD  DATE: March 12, 2018  TIME: 11:18 AM

## 2018-03-12 NOTE — PLAN OF CARE
Problem: Potential for Falls  Goal: Patient will remain free of falls  INTERVENTIONS:  - Assess patient frequently for physical needs  -  Identify cognitive and physical deficits and behaviors that affect risk of falls    -  [place] fall precautions as indicated by assessment   - Educate patient/family on patient safety including physical limitations  - Instruct patient to call for assistance with activity based on assessment  - Modify environment to reduce risk of injury  - Consider OT/PT consult to assist with strengthening/mobility   Outcome: Progressing

## 2018-03-12 NOTE — ANESTHESIA POSTPROCEDURE EVALUATION
Post-Op Assessment Note      CV Status:  Stable    Mental Status:  Alert and awake    Hydration Status:  Euvolemic    PONV Controlled:  Controlled    Airway Patency:  Patent  Airway: intubated    Post Op Vitals Reviewed: Yes          Staff: Anesthesiologist           BP      Temp     Pulse     Resp      SpO2

## 2018-03-13 VITALS
BODY MASS INDEX: 28.37 KG/M2 | DIASTOLIC BLOOD PRESSURE: 68 MMHG | TEMPERATURE: 99 F | HEART RATE: 57 BPM | RESPIRATION RATE: 16 BRPM | WEIGHT: 187.2 LBS | OXYGEN SATURATION: 95 % | SYSTOLIC BLOOD PRESSURE: 108 MMHG | HEIGHT: 68 IN

## 2018-03-13 LAB
ANION GAP SERPL CALCULATED.3IONS-SCNC: 7 MMOL/L (ref 4–13)
BUN SERPL-MCNC: 13 MG/DL (ref 5–25)
CALCIUM SERPL-MCNC: 8.3 MG/DL (ref 8.3–10.1)
CHLORIDE SERPL-SCNC: 105 MMOL/L (ref 100–108)
CO2 SERPL-SCNC: 25 MMOL/L (ref 21–32)
CREAT SERPL-MCNC: 0.95 MG/DL (ref 0.6–1.3)
ERYTHROCYTE [DISTWIDTH] IN BLOOD BY AUTOMATED COUNT: 12.5 % (ref 11.6–15.1)
GFR SERPL CREATININE-BSD FRML MDRD: 85 ML/MIN/1.73SQ M
GLUCOSE SERPL-MCNC: 96 MG/DL (ref 65–140)
HCT VFR BLD AUTO: 38.1 % (ref 36.5–49.3)
HGB BLD-MCNC: 13.3 G/DL (ref 12–17)
MCH RBC QN AUTO: 31.4 PG (ref 26.8–34.3)
MCHC RBC AUTO-ENTMCNC: 34.9 G/DL (ref 31.4–37.4)
MCV RBC AUTO: 90 FL (ref 82–98)
PLATELET # BLD AUTO: 187 THOUSANDS/UL (ref 149–390)
PMV BLD AUTO: 11.1 FL (ref 8.9–12.7)
POTASSIUM SERPL-SCNC: 3.6 MMOL/L (ref 3.5–5.3)
RBC # BLD AUTO: 4.24 MILLION/UL (ref 3.88–5.62)
SODIUM SERPL-SCNC: 137 MMOL/L (ref 136–145)
WBC # BLD AUTO: 7.43 THOUSAND/UL (ref 4.31–10.16)

## 2018-03-13 PROCEDURE — 85027 COMPLETE CBC AUTOMATED: CPT | Performed by: UROLOGY

## 2018-03-13 PROCEDURE — 80048 BASIC METABOLIC PNL TOTAL CA: CPT | Performed by: UROLOGY

## 2018-03-13 PROCEDURE — 99024 POSTOP FOLLOW-UP VISIT: CPT | Performed by: UROLOGY

## 2018-03-13 RX ADMIN — HEPARIN SODIUM 5000 UNITS: 5000 INJECTION, SOLUTION INTRAVENOUS; SUBCUTANEOUS at 05:50

## 2018-03-13 RX ADMIN — KETOROLAC TROMETHAMINE 30 MG: 30 INJECTION, SOLUTION INTRAMUSCULAR at 05:47

## 2018-03-13 RX ADMIN — METOCLOPRAMIDE 10 MG: 5 INJECTION, SOLUTION INTRAMUSCULAR; INTRAVENOUS at 05:47

## 2018-03-13 RX ADMIN — FAMOTIDINE 20 MG: 20 TABLET, FILM COATED ORAL at 05:50

## 2018-03-13 RX ADMIN — DOCUSATE SODIUM 100 MG: 100 CAPSULE, LIQUID FILLED ORAL at 09:19

## 2018-03-13 RX ADMIN — BACITRACIN, NEOMYCIN, POLYMYXIN B 1 SMALL APPLICATION: 400; 3.5; 5 OINTMENT TOPICAL at 09:20

## 2018-03-13 RX ADMIN — CIPROFLOXACIN HYDROCHLORIDE 250 MG: 250 TABLET, FILM COATED ORAL at 09:20

## 2018-03-13 NOTE — ASSESSMENT & PLAN NOTE
1 Day Post-Op  Procedure(s):  LAPAROSCOPIC ROBOTIC RADICAL PROSTATECTOMY, BILATERAL PELVIC LYMPH NODE DISSECTION  Surgeon(s):  ROSARIO Conley MD  3/12/2018    Doing great  Plan:  Discharge home today  Outpatient follow-up for MANAV drain removal   Reviewed discharge restrictions, instructions, prescriptions  All questions answered  Prescriptions given to the wife to fill at the pharmacy here at [address]

## 2018-03-13 NOTE — PROGRESS NOTES
Progress Note - Urology  Tunde Ellis 1955, 58 y o  male MRN: [id number]    Unit/Bed#: E2 -01 Encounter: [id number]    * Malignant neoplasm of prostate ([facility] [address] )   Assessment & Plan    1 Day Post-Op  Procedure(s):  LAPAROSCOPIC ROBOTIC RADICAL PROSTATECTOMY, BILATERAL PELVIC LYMPH NODE DISSECTION  Surgeon(s):  ROSARIO Turner MD  3/12/2018    Doing great  Plan:  Discharge home today  Outpatient follow-up for MANAV drain removal   Reviewed discharge restrictions, instructions, prescriptions  All questions answered  Prescriptions given to the wife to fill at the pharmacy here at [address]  Bedside rounds performed with Darell Thakur RN  Subjective/Objective     Subjective:   Reports  Minimal nausea postoperatively yesterday after and seizure around noontime  This quickly abated  He tolerated clear liquids throughout the evening yesterday without nausea or vomiting  Passing flatus this morning  No bowel movement yet  Reports no issues a Peck catheter draining or discomfort from the Peck catheter  Slept well  Good pain control  Objective:  Vitals: Blood pressure 108/68, pulse 57, temperature 99 °F (37 2 °C), temperature source Tympanic, resp  rate 16, height 5' 8" (1 727 m), weight 84 9 kg (187 lb 3 2 oz), SpO2 95 %  ,Body mass index is 28 46 kg/m²  Intake/Output Summary (Last 24 hours) at 03/13/18 0914  Last data filed at 03/13/18 0701   Gross per 24 hour   Intake           3827 5 ml   Output             2520 ml   Net           1307 5 ml       Invasive Devices     Peripheral Intravenous Line            Peripheral IV 03/12/18 Left Hand 1 day    Peripheral IV 03/12/18 Right Wrist 1 day          Drain            Urethral Catheter Latex 20 Fr  1 day    Closed/Suction Drain Right Abdomen Bulb 10 Fr  less than 1 day                Physical Exam   Constitutional: He is oriented to person, place, and time  He appears well-developed and well-nourished   No distress  Pleasant well-appearing 63-year-old male out of bed and ambulating around the room without visible discomfort  Wife at the bedside  No acute distress  HENT:   Head: Atraumatic  Eyes: EOM are normal    Neck: Neck supple  Cardiovascular: Normal rate, regular rhythm and normal heart sounds  No murmur heard  Pulmonary/Chest: Effort normal and breath sounds normal  No respiratory distress  He has no wheezes  Abdominal: Soft  Bowel sounds are normal    Status post robotic assisted laparoscopic prostatectomy  Incisions with skin glue in place  Right-sided MANAV drain with serosanguineous drainage, draining freely  Expected aleta-incisional tenderness  Good bowel sounds  Genitourinary:   Genitourinary Comments: Peck catheter in place  Draining clear pink tinged urine without visible clots  No blood or discharge around the meatus noted  Musculoskeletal: He exhibits no edema or deformity  Neurological: He is alert and oriented to person, place, and time  Skin: Skin is warm and dry  No rash noted  He is not diaphoretic  No erythema  No pallor  Psychiatric: He has a normal mood and affect  His behavior is normal  Judgment and thought content normal    Nursing note and vitals reviewed        Labs:  Recent Labs      03/13/18   0406   WBC  7 43     Recent Labs      03/13/18   0406   HGB  13 3       Recent Labs      03/13/18   0406   CREATININE  0 95     Rubia Sepulveda [place]  Date: 3/13/2018 Time: 9:14 AM

## 2018-03-13 NOTE — DISCHARGE SUMMARY
Discharge Summary - Neelam Pop 58 y o  male MRN: [id number]    Unit/Bed#: E2 -60 Encounter: [id number]    Admission Date: 3/12/2018     Discharge Date:  03/13/18    HPI:  79-year-old male with Beckley score 6 in 7 prostate cancer transrectal ultrasound-guided prostate biopsy  He presented for outpatient elective laparoscopic robotic radical prostatectomy with bilateral pelvic lymph node dissection by Dr Dirk Gold  Procedure(s):  LAPAROSCOPIC ROBOTIC RADICAL PROSTATECTOMY, BILATERAL PELVIC LYMPH NODE DISSECTION  Surgeon(s):  ROSARIO Mendenhall MD  3/12/2018    Hospital Course:  Postoperatively he recovered from anesthesia without event  He was transferred to the medical-surgical floor  He began tolerating clear liquids without issue  No nausea or vomiting  Positive flatus  His Peck catheter was draining well  He had adequate pain control and was using incentive spirometer and ambulating around the room without any limitation secondary to pain  He was eager to be discharged home on postoperative day 1   MANAV drain was serosanguineous  He was discharged home with a MANAV drain and Peck catheter in place  He was given prescriptions for Cipro, oxybutynin, Percocet  All discharge instructions restrictions and questions reviewed and answered prior to discharge home  Wife was present for instructions  Discharge Diagnosis: Malignant neoplasm of prostate ([facility] [address] )    Condition at Discharge: good     Discharge Medications:  See after visit summary for reconciled discharge medications provided to patient and family  Discharge instructions/Information to patient and family:   See after visit summary for information provided to patient and family  Provisions for Follow-Up Care:  See after visit summary for information related to follow-up care and any pertinent home health orders        Disposition: Home    Planned Readmission: No    Discharge Statement   I spent 20 minutes discharging the patient  This time was spent on the day of discharge  I had direct contact with the patient on the day of discharge  Additional documentation is required if more than 30 minutes were spent on discharge       Signature:   Ida Milner PA-C  Date: 3/13/2018 Time: 9:14 AM

## 2018-03-13 NOTE — DISCHARGE INSTRUCTIONS
Robot Assisted Laparoscopic Prostatectomy   WHAT YOU NEED TO KNOW:   Robot-assisted laparoscopic prostatectomy (RALP) is surgery to remove your prostate gland through small incisions in your abdomen  RALP is done with a machine that is controlled by your surgeon  The machine has mechanical arms that use small tools to remove your prostate  DISCHARGE INSTRUCTIONS:   Medicines:   · Pain medicine: You may be given a prescription medicine to decrease pain  Do not wait until the pain is severe before you take this medicine  · Stool softeners: This medicine makes it easier for you to have a bowel movement  You may need this medicine to treat or prevent constipation  · Antibiotics: This medicine is given to fight or prevent an infection caused by bacteria  Always take your antibiotics exactly as ordered by your healthcare provider  Do not stop taking your medicine unless directed by your healthcare provider  Never save antibiotics or take leftover antibiotics that were given to you for another illness  · Take your medicine as directed  Contact your healthcare provider if you think your medicine is not helping or if you have side effects  Tell him or her if you are allergic to any medicine  Keep a list of the medicines, vitamins, and herbs you take  Include the amounts, and when and why you take them  Bring the list or the pill bottles to follow-up visits  Carry your medicine list with you in case of an emergency  Follow up with your healthcare provider or uro-oncologist in 1 week or as directed: You will need your urinary catheter removed  Tests will show if any cancer remains in your body after surgery  Write down your questions so you remember to ask them during your visits  A Peck catheter  is a tube put into your bladder to drain urine into a bag  Keep the bag below your waist  This will prevent urine from flowing back into your bladder and causing an infection or other problems   Also, keep the tube free of kinks so the urine will drain properly  Do not pull on the catheter  This can cause pain and bleeding, and may cause the catheter to come out  Wound care: Follow your healthcare provider's directions on how to care for your incisions  Ask when you can start showering or bathing  You will need to keep your stitches covered so they do not get wet  What to expect after surgery:   · Activities: You may feel like resting more after surgery  Slowly start to do more each day  Rest when you feel it is needed  ¨ Normal daily activities:  Ask your healthcare provider when it is safe to return to your normal daily activities  You may need to wait 4 to 6 weeks after surgery  Your healthcare provider will tell you about the activities you should avoid after your surgery  These may include driving while you are taking pain medicines or until your catheter is removed  You may also be given a weight restriction on lifting objects  ¨ Walking and other exercise:  Walking is an important activity to help with your recovery after surgery  Walking is a good way to improve your overall health and help you recover sooner  It also helps keep your blood flowing and reduces the risk of blood clots  Other types of exercise can also be an important part of your recovery  Ask about the exercises that are safe for you  ¨ Sexual activity:  Ask when it is safe to start having sexual intercourse again  You may have trouble having an erection  Your erections may return with time  Medicines and mechanical aids may help  Talk to your healthcare provider about these options  · Bladder control:  After surgery, you may have problems controlling when you urinate  Ask your healthcare provider about pads and liners that absorb urine while you recover  · Constipation:  You may have problems having bowel movements during your recovery   Ask your healthcare provider about diet changes if you are having problems with constipation  Contact your healthcare provider or uro-oncologist if:   · You have a fever  · Your pain is getting worse, even with medicine  · You have an incision that is red, swollen, or has pus coming from it  · You are urinating less than usual      · You have trouble urinating or having a bowel movement  · You have questions or concerns about your condition or care  Seek care immediately or call 911 if:   · You have more blood in your urine than you were told to expect, or you pass a blood clot  · You have an upset stomach or are vomiting  · You have painful swelling in your abdomen that does not go away  · You suddenly feel lightheaded and short of breath  · You have chest pain when you take a deep breath or cough  You cough up blood  · Your arm or leg feels warm, tender, and painful  It may look swollen and red  © 2017 [address] Information is for End User's use only and may not be sold, redistributed or otherwise used for commercial purposes  All illustrations and images included in CareNotes® are the copyrighted property of A D A M , Inc  or [employer] Jurado  The above information is an  only  It is not intended as medical advice for individual conditions or treatments  Talk to your doctor, nurse or pharmacist before following any medical regimen to see if it is safe and effective for you

## 2018-03-14 NOTE — CASE MANAGEMENT
Notification of Discharge  This is a Notification of Discharge from our facility [address]  Please be advised that this patient has been discharge from our facility  Below you will find the admission and discharge date and time including the patients disposition  PRESENTATION DATE: 3/12/2018  5:48 AM  IP ADMISSION DATE: 3/12/18 1115  DISCHARGE DATE: 3/13/2018 11:34 AM  DISPOSITION: Home/Self Care    [facility] in the [facility] by Bipin Jurado for 2017  Network Utilization Review Department  Phone: [phone]; Fax [phone]  ATTENTION: The Network Utilization Review Department is now centralized for our 7 Facilities  Make a note that we have a new phone and fax numbers for our Department  Please call with any questions or concerns to [phone] and carefully follow the prompts so that you are directed to the right person  All voicemails are confidential  Fax any determinations, approvals, denials, and requests for initial or continue stay review clinical to [phone]  Due to HIGH CALL volume, it would be easier if you could please send faxed requests to expedite your requests and in part, help us provide discharge notifications faster

## 2018-03-14 NOTE — CASE MANAGEMENT
Initial Clinical Review    Age/Sex: 58 y o  male    Surgery Date:     3/12/2018    Procedure:   Preop Diagnosis:  Malignant neoplasm of prostate ([facility] [address] ) [place]     Post-Op Diagnosis Codes:     * Malignant neoplasm of prostate ([facility] [address] ) [place]     Procedure(s) (LRB):  LAPAROSCOPIC ROBOTIC RADICAL PROSTATECTOMY, BILATERAL PELVIC LYMPH NODE DISSECTION (N/A)       Anesthesia:    general    Admission Orders: Date/Time/Statement: 3/12/18 @ 1115     Orders Placed This Encounter   Procedures    Inpatient Admission     Standing Status:   Standing     Number of Occurrences:   1     Order Specific Question:   Admitting Physician     Answer:   Geri Dose [[id number]]     Order Specific Question:   Level of Care     Answer:   Med Surg [[id number]]     Order Specific Question:   Bed request comments     Answer:   east     Order Specific Question:   Estimated length of stay     Answer:   Inpatient Only Surgery       Vital Signs: /68 (BP Location: Right arm)   Pulse 57   Temp 99 °F (37 2 °C) (Tympanic)   Resp 16   Ht 5' 8" (1 727 m)   Wt 84 9 kg (187 lb 3 2 oz)   SpO2 95%   BMI 28 46 kg/m²     Diet:     Mobility:   As jocy    DVT Prophylaxis:   SCD'S    Pain Control:   Pain Medications             oxyCODONE-acetaminophen (PERCOCET) 5-325 mg per tablet Take 1 tablet by mouth every 4 (four) hours as needed for moderate pain for up to 20 doses Max Daily Amount: 6 tablets        Cl liq  Diet - advance to reg  MANAV drain/care    Pt  D/c  Home  3/13      Thank you,  [address] [place] [address] in the [employer] by Bipin Jurado for 2017  Network Utilization Review Department  Phone: [phone]; Fax [phone]  ATTENTION: The Network Utilization Review Department is now centralized for our 7 Facilities  Make a note that we have a new phone and fax numbers for our Department   Please call with any questions or concerns to [phone] and carefully follow the prompts so that you are directed to the right person  All voicemails are confidential  Fax any determinations, approvals, denials, and requests for initial or continue stay review clinical to [phone]  Due to HIGH CALL volume, it would be easier if you could please send faxed requests to expedite your requests and in part, help us provide discharge notifications faster

## 2018-03-15 ENCOUNTER — TELEPHONE (OUTPATIENT)
Dept: UROLOGY | Facility: MEDICAL CENTER | Age: 63
End: 2018-03-15

## 2018-03-19 ENCOUNTER — TELEPHONE (OUTPATIENT)
Dept: UROLOGY | Facility: AMBULATORY SURGERY CENTER | Age: 63
End: 2018-03-19

## 2018-03-19 NOTE — TELEPHONE ENCOUNTER
Spoke to pt had 180 cc's J/P drainage on 3/16 -175 cc's on 3/17 -155 cc's on 3/18 and since 2:30 this am to 8 am has 80 c's so far today,  Will direct to KYE Ellison Rn to sched   J/P and pereyra removal

## 2018-03-19 NOTE — TELEPHONE ENCOUNTER
Patient called wanted to schedule an appt with Jazmin Thomason I didn't show an opening till next week patient wanted to be seen sooner  He can be reached at [phone]

## 2018-03-20 ENCOUNTER — OFFICE VISIT (OUTPATIENT)
Dept: UROLOGY | Facility: MEDICAL CENTER | Age: 63
End: 2018-03-20

## 2018-03-20 VITALS
WEIGHT: 187 LBS | DIASTOLIC BLOOD PRESSURE: 70 MMHG | SYSTOLIC BLOOD PRESSURE: 140 MMHG | BODY MASS INDEX: 28.34 KG/M2 | HEIGHT: 68 IN

## 2018-03-20 DIAGNOSIS — C61 PROSTATE CANCER (HCC): Primary | ICD-10-CM

## 2018-03-20 PROCEDURE — 99024 POSTOP FOLLOW-UP VISIT: CPT

## 2018-03-20 NOTE — PROGRESS NOTES
Procedures  MANAV Drain removal done without any problems  105cc of fluid in drain bottle  Patient is moving his bowels and his urine is clear  His incisions are a little red but ok  He is eating and drinking and ambulating fine  Per Dr Hussain James his drain was removed without problems  Patient is to come back in 1 week for pereyra removal

## 2018-03-27 ENCOUNTER — OFFICE VISIT (OUTPATIENT)
Dept: UROLOGY | Facility: MEDICAL CENTER | Age: 63
End: 2018-03-27

## 2018-03-27 VITALS
SYSTOLIC BLOOD PRESSURE: 120 MMHG | HEIGHT: 68 IN | WEIGHT: 187 LBS | DIASTOLIC BLOOD PRESSURE: 80 MMHG | BODY MASS INDEX: 28.34 KG/M2

## 2018-03-27 DIAGNOSIS — R33.9 URINARY RETENTION: Primary | ICD-10-CM

## 2018-03-27 PROCEDURE — 99024 POSTOP FOLLOW-UP VISIT: CPT

## 2018-03-27 RX ORDER — DOCUSATE SODIUM 100 MG/1
100 CAPSULE, LIQUID FILLED ORAL 2 TIMES DAILY
COMMUNITY
End: 2019-10-04 | Stop reason: ALTCHOICE

## 2018-03-27 NOTE — PROGRESS NOTES
Procedures   Patient returns for Peck removal  Peck removed without difficulty, patient tolerated procedure well  Urine draining is clear  Denies fever or urinary symptoms  Patient instructed to increase fluids and limit caffeine intake  To return to office if unable to void within 4-6 hours, or has increased discomfort  Patient will return in 2 weeks to see Dr Ashley Orta and Arlon Lanes

## 2018-03-29 NOTE — PROGRESS NOTES
I have reviewed the notes, assessments, and/or procedures performed , I concur with her/his documentation of Tunde Ellis

## 2018-04-10 ENCOUNTER — OFFICE VISIT (OUTPATIENT)
Dept: UROLOGY | Facility: MEDICAL CENTER | Age: 63
End: 2018-04-10

## 2018-04-10 VITALS
BODY MASS INDEX: 28.79 KG/M2 | HEIGHT: 68 IN | DIASTOLIC BLOOD PRESSURE: 68 MMHG | WEIGHT: 190 LBS | SYSTOLIC BLOOD PRESSURE: 102 MMHG

## 2018-04-10 DIAGNOSIS — N39.46 MIXED STRESS AND URGE URINARY INCONTINENCE: ICD-10-CM

## 2018-04-10 DIAGNOSIS — C61 MALIGNANT NEOPLASM OF PROSTATE (HCC): Primary | ICD-10-CM

## 2018-04-10 DIAGNOSIS — Z90.79 ACQUIRED ABSENCE OF OTHER GENITAL ORGAN(S): ICD-10-CM

## 2018-04-10 PROCEDURE — 99024 POSTOP FOLLOW-UP VISIT: CPT | Performed by: UROLOGY

## 2018-04-10 NOTE — PROGRESS NOTES
Assessment/Plan:      Diagnoses and all orders for this visit:    Malignant neoplasm of prostate ([address] )  -     PSA Total, Diagnostic; Future    Acquired absence of other genital organ(s)    Mixed stress and urge urinary incontinence    Other orders  -     Omega-3 Fatty Acids (FISH OIL PO); Take 3 capsules by mouth daily        Plan:  He will continue with his Kegel exercises, and follow up with us for biofeedback continence instruction  Subjective:  Post prostatectomy incontinence     Patient ID: Vicky Mckinney is a 58 y o  male  HPI  He is now a month after his laparoscopic robotic radical prostatectomy, and 2 weeks after his Peck catheter was removed  He has been having both mixed urge and stress incontinence which has not improved much with his exercises  He goes through about 6-7 pads per day  He is patient with his recovery, and understand it may take some time for sphincter to become adequately competent  He has no other complaints or problems to report  Review of Systems   Constitutional: Negative  HENT: Negative  Eyes: Negative  Respiratory: Negative  Cardiovascular: Negative  Gastrointestinal: Negative  Endocrine: Negative  Genitourinary: Positive for urgency  Musculoskeletal: Negative  Skin: Negative  Allergic/Immunologic: Negative  Neurological: Negative  Hematological: Negative  Psychiatric/Behavioral: Negative  Objective:     Physical Exam   Constitutional: He is oriented to person, place, and time  He appears well-developed and well-nourished  HENT:   Head: Normocephalic and atraumatic  Eyes: Conjunctivae are normal    Neck: Normal range of motion  Neck supple  Pulmonary/Chest: Effort normal and breath sounds normal    Abdominal: Soft  Bowel sounds are normal  He exhibits no distension and no mass  There is no tenderness  Musculoskeletal: Normal range of motion  Neurological: He is alert and oriented to person, place, and time  Skin: Skin is warm and dry  Psychiatric: He has a normal mood and affect   His behavior is normal  Judgment and thought content normal

## 2018-04-12 ENCOUNTER — TELEPHONE (OUTPATIENT)
Dept: UROLOGY | Facility: AMBULATORY SURGERY CENTER | Age: 63
End: 2018-04-12

## 2018-04-12 NOTE — TELEPHONE ENCOUNTER
Correct phone # for Deidre Brown is [phone]  ,patient will be returning to work May 14  And no heavy lifting  Was given to [employer] Is this ok?

## 2018-04-26 ENCOUNTER — TELEPHONE (OUTPATIENT)
Dept: UROLOGY | Facility: AMBULATORY SURGERY CENTER | Age: 63
End: 2018-04-26

## 2018-05-08 ENCOUNTER — DOCUMENTATION (OUTPATIENT)
Dept: UROLOGY | Facility: MEDICAL CENTER | Age: 63
End: 2018-05-08

## 2018-05-08 ENCOUNTER — TELEPHONE (OUTPATIENT)
Dept: UROLOGY | Facility: MEDICAL CENTER | Age: 63
End: 2018-05-08

## 2018-05-08 NOTE — TELEPHONE ENCOUNTER
Patient needs detailed note for his planned cruise he was unable to go on  This would be for reimbursement for the cruise

## 2018-05-21 ENCOUNTER — TELEPHONE (OUTPATIENT)
Dept: UROLOGY | Facility: MEDICAL CENTER | Age: 63
End: 2018-05-21

## 2018-05-21 NOTE — TELEPHONE ENCOUNTER
Received return  to work certification form for [employer]  I left a message on the patients home phone number to check on RTW date  Previous phone message states 5/14/18  Form received today states the patient is not currently working

## 2018-05-22 NOTE — TELEPHONE ENCOUNTER
Return phone call from patient, official return to work date is 5/22/2018  Patient will be retiring once he uses up his vacation time and needs the RTW form faxed to start the process   Form completed as per the patients wishes and faxed to [facility], [facility]  [phone]  Copy mailed to the patient home address

## 2018-05-31 NOTE — TELEPHONE ENCOUNTER
[employer], rep name, Marcellus Riggins, requests call back from clinical staff regarding pre-op / post op questions on patient    Please call [phone]    Ref # for case [id number]
Spoke with representative and answered all her questions 
98

## 2018-06-06 ENCOUNTER — APPOINTMENT (OUTPATIENT)
Dept: LAB | Facility: MEDICAL CENTER | Age: 63
End: 2018-06-06
Attending: UROLOGY
Payer: COMMERCIAL

## 2018-06-06 DIAGNOSIS — C61 MALIGNANT NEOPLASM OF PROSTATE (HCC): ICD-10-CM

## 2018-06-06 LAB — PSA SERPL-MCNC: <0.1 NG/ML (ref 0–4)

## 2018-06-06 PROCEDURE — 84153 ASSAY OF PSA TOTAL: CPT

## 2018-06-13 ENCOUNTER — OFFICE VISIT (OUTPATIENT)
Dept: UROLOGY | Facility: MEDICAL CENTER | Age: 63
End: 2018-06-13
Payer: COMMERCIAL

## 2018-06-13 VITALS
WEIGHT: 190 LBS | BODY MASS INDEX: 28.79 KG/M2 | SYSTOLIC BLOOD PRESSURE: 120 MMHG | HEIGHT: 68 IN | DIASTOLIC BLOOD PRESSURE: 70 MMHG

## 2018-06-13 DIAGNOSIS — N39.3 STRESS INCONTINENCE, MALE: ICD-10-CM

## 2018-06-13 DIAGNOSIS — C61 MALIGNANT NEOPLASM OF PROSTATE (HCC): Primary | ICD-10-CM

## 2018-06-13 DIAGNOSIS — Z90.79 HISTORY OF RADICAL PROSTATECTOMY: ICD-10-CM

## 2018-06-13 LAB
SL AMB  POCT GLUCOSE, UA: ABNORMAL
SL AMB LEUKOCYTE ESTERASE,UA: ABNORMAL
SL AMB POCT BILIRUBIN,UA: ABNORMAL
SL AMB POCT BLOOD,UA: ABNORMAL
SL AMB POCT CLARITY,UA: CLEAR
SL AMB POCT COLOR,UA: YELLOW
SL AMB POCT KETONES,UA: ABNORMAL
SL AMB POCT NITRITE,UA: ABNORMAL
SL AMB POCT PH,UA: 5.5
SL AMB POCT SPECIFIC GRAVITY,UA: 1.02
SL AMB POCT URINE PROTEIN: ABNORMAL
SL AMB POCT UROBILINOGEN: 0.2

## 2018-06-13 PROCEDURE — 99214 OFFICE O/P EST MOD 30 MIN: CPT | Performed by: UROLOGY

## 2018-06-13 PROCEDURE — 81003 URINALYSIS AUTO W/O SCOPE: CPT | Performed by: UROLOGY

## 2018-06-13 NOTE — PROGRESS NOTES
Assessment/Plan:      Diagnoses and all orders for this visit:    Malignant neoplasm of prostate ([address] )  -     POCT urine dip auto non-scope  -     PSA Total, Diagnostic; Future    History of radical prostatectomy    Stress incontinence, male  Comments:  Minimal and improving          Subjective:  Much improved, minimal stress incontinence     Patient ID: Aureliano Jones is a 58 y o  male  HPI  He is now 3 months after his laparoscopic robotic radical prostatectomy  He feels well has a good force of stream and reasonably good urinary control  Has a little bit of stress incontinence of a few drops of urine with coughing and lifting  He denies any pain, discomfort in urination, hematuria or dysuria  Review of Systems   Constitutional: Negative  HENT: Negative  Eyes: Negative  Respiratory: Negative  Cardiovascular: Negative  Gastrointestinal: Negative  Endocrine: Negative  Genitourinary: Negative  Musculoskeletal: Negative  Skin: Negative  Allergic/Immunologic: Negative  Neurological: Negative  Hematological: Negative  Psychiatric/Behavioral: Negative  Objective:     Physical Exam   Constitutional: He is oriented to person, place, and time  He appears well-developed and well-nourished  No distress  HENT:   Head: Normocephalic and atraumatic  Nose: Nose normal    Mouth/Throat: Oropharynx is clear and moist    Eyes: Conjunctivae and EOM are normal  Pupils are equal, round, and reactive to light  No scleral icterus  Neck: Normal range of motion  Neck supple  Cardiovascular: Normal rate, regular rhythm, normal heart sounds and intact distal pulses  No murmur heard  Pulmonary/Chest: Effort normal and breath sounds normal  No respiratory distress  He has no wheezes  He has no rales  Abdominal: Soft  Bowel sounds are normal  He exhibits no distension and no mass  There is no tenderness  Musculoskeletal: Normal range of motion   He exhibits no edema or tenderness  Lymphadenopathy:     He has no cervical adenopathy  Neurological: He is alert and oriented to person, place, and time  No cranial nerve deficit  Skin: Skin is warm and dry  No rash noted  No erythema  No pallor  Psychiatric: He has a normal mood and affect  His behavior is normal  Judgment and thought content normal    Nursing note and vitals reviewed        PSA from 06/06/2018 is less than 0 1

## 2018-09-06 ENCOUNTER — TELEPHONE (OUTPATIENT)
Dept: UROLOGY | Facility: MEDICAL CENTER | Age: 63
End: 2018-09-06

## 2018-09-06 DIAGNOSIS — C61 PROSTATE CANCER (HCC): Primary | ICD-10-CM

## 2018-09-11 ENCOUNTER — APPOINTMENT (OUTPATIENT)
Dept: LAB | Facility: MEDICAL CENTER | Age: 63
End: 2018-09-11
Attending: UROLOGY
Payer: COMMERCIAL

## 2018-09-11 DIAGNOSIS — C61 MALIGNANT NEOPLASM OF PROSTATE (HCC): ICD-10-CM

## 2018-09-11 LAB — PSA SERPL-MCNC: <0.1 NG/ML (ref 0–4)

## 2018-09-11 PROCEDURE — 84153 ASSAY OF PSA TOTAL: CPT

## 2018-09-19 ENCOUNTER — OFFICE VISIT (OUTPATIENT)
Dept: UROLOGY | Facility: MEDICAL CENTER | Age: 63
End: 2018-09-19
Payer: COMMERCIAL

## 2018-09-19 VITALS
DIASTOLIC BLOOD PRESSURE: 80 MMHG | HEIGHT: 68 IN | WEIGHT: 190 LBS | SYSTOLIC BLOOD PRESSURE: 132 MMHG | BODY MASS INDEX: 28.79 KG/M2

## 2018-09-19 DIAGNOSIS — Z90.79 HISTORY OF RADICAL PROSTATECTOMY: ICD-10-CM

## 2018-09-19 DIAGNOSIS — C61 MALIGNANT NEOPLASM OF PROSTATE (HCC): Primary | ICD-10-CM

## 2018-09-19 DIAGNOSIS — N39.3 STRESS INCONTINENCE, MALE: ICD-10-CM

## 2018-09-19 LAB
SL AMB  POCT GLUCOSE, UA: NEGATIVE
SL AMB LEUKOCYTE ESTERASE,UA: NEGATIVE
SL AMB POCT BILIRUBIN,UA: NEGATIVE
SL AMB POCT BLOOD,UA: ABNORMAL
SL AMB POCT CLARITY,UA: CLEAR
SL AMB POCT COLOR,UA: YELLOW
SL AMB POCT KETONES,UA: NEGATIVE
SL AMB POCT NITRITE,UA: NEGATIVE
SL AMB POCT PH,UA: 5.5
SL AMB POCT SPECIFIC GRAVITY,UA: 1.02
SL AMB POCT URINE PROTEIN: NEGATIVE
SL AMB POCT UROBILINOGEN: 0.2

## 2018-09-19 PROCEDURE — 99214 OFFICE O/P EST MOD 30 MIN: CPT | Performed by: UROLOGY

## 2018-09-19 PROCEDURE — 81003 URINALYSIS AUTO W/O SCOPE: CPT | Performed by: UROLOGY

## 2018-09-19 NOTE — PROGRESS NOTES
Assessment/Plan:      Diagnoses and all orders for this visit:    Malignant neoplasm of prostate ([address] )  -     POCT urine dip auto non-scope    History of radical prostatectomy    Stress incontinence, male        Plan:  I recommended that he resume his Kegel exercises  Subjective:  No complaints     Patient ID: Claudia Dwyer is a 61 y o  male  HPI  He is now 6 months after his laparoscopic robotic radical prostatectomy  He feels well has a good force of stream and reasonably good urinary control  Has a little bit of stress incontinence of a few drops of urine with coughing and lifting, but he admits that he has not been doing his Kegel exercises routinely, as recommended  He denies any pain, discomfort in urination, hematuria or dysuria  He is patiently awaiting to see if his erectile function returns on its own, , and when asked, he is not interested any of the pharmacologic remedies at this time  Review of Systems   Constitutional: Negative  HENT: Negative  Eyes: Negative  Respiratory: Negative  Cardiovascular: Negative  Gastrointestinal: Negative  Endocrine: Negative  Genitourinary: Negative  Musculoskeletal: Negative  Skin: Negative  Allergic/Immunologic: Negative  Neurological: Negative  Hematological: Negative  Psychiatric/Behavioral: Negative  Objective:     Physical Exam   Constitutional: He is oriented to person, place, and time  He appears well-developed and well-nourished  No distress  HENT:   Head: Normocephalic and atraumatic  Nose: Nose normal    Mouth/Throat: Oropharynx is clear and moist    Eyes: Conjunctivae and EOM are normal  Pupils are equal, round, and reactive to light  No scleral icterus  Neck: Normal range of motion  Neck supple  Cardiovascular: Normal rate, regular rhythm, normal heart sounds and intact distal pulses  No murmur heard  Pulmonary/Chest: Effort normal and breath sounds normal  No respiratory distress   He has no wheezes  He has no rales  Abdominal: Soft  Bowel sounds are normal  He exhibits no distension and no mass  There is no tenderness  Musculoskeletal: Normal range of motion  He exhibits no edema or tenderness  Lymphadenopathy:     He has no cervical adenopathy  Neurological: He is alert and oriented to person, place, and time  No cranial nerve deficit  Skin: Skin is warm and dry  No rash noted  No erythema  No pallor  Psychiatric: He has a normal mood and affect  His behavior is normal  Judgment and thought content normal    Nursing note and vitals reviewed        PSA from 09/11/2018 was less than 0 1

## 2019-03-14 ENCOUNTER — APPOINTMENT (OUTPATIENT)
Dept: LAB | Facility: MEDICAL CENTER | Age: 64
End: 2019-03-14
Attending: UROLOGY
Payer: COMMERCIAL

## 2019-03-14 DIAGNOSIS — C61 MALIGNANT NEOPLASM OF PROSTATE (HCC): ICD-10-CM

## 2019-03-14 LAB — PSA SERPL-MCNC: <0.1 NG/ML (ref 0–4)

## 2019-03-14 PROCEDURE — 84153 ASSAY OF PSA TOTAL: CPT

## 2019-03-27 ENCOUNTER — OFFICE VISIT (OUTPATIENT)
Dept: UROLOGY | Facility: MEDICAL CENTER | Age: 64
End: 2019-03-27
Payer: COMMERCIAL

## 2019-03-27 VITALS
HEART RATE: 73 BPM | SYSTOLIC BLOOD PRESSURE: 116 MMHG | BODY MASS INDEX: 30.77 KG/M2 | DIASTOLIC BLOOD PRESSURE: 70 MMHG | HEIGHT: 68 IN | WEIGHT: 203 LBS

## 2019-03-27 DIAGNOSIS — Z90.79 HISTORY OF ROBOT-ASSISTED LAPAROSCOPIC RADICAL PROSTATECTOMY: ICD-10-CM

## 2019-03-27 DIAGNOSIS — C61 MALIGNANT NEOPLASM OF PROSTATE (HCC): Primary | ICD-10-CM

## 2019-03-27 LAB
SL AMB  POCT GLUCOSE, UA: NORMAL
SL AMB LEUKOCYTE ESTERASE,UA: NORMAL
SL AMB POCT BILIRUBIN,UA: NORMAL
SL AMB POCT BLOOD,UA: NORMAL
SL AMB POCT CLARITY,UA: CLEAR
SL AMB POCT COLOR,UA: NORMAL
SL AMB POCT KETONES,UA: NORMAL
SL AMB POCT NITRITE,UA: NORMAL
SL AMB POCT PH,UA: 5.5
SL AMB POCT SPECIFIC GRAVITY,UA: 1.01
SL AMB POCT URINE PROTEIN: NORMAL
SL AMB POCT UROBILINOGEN: 0.2

## 2019-03-27 PROCEDURE — 99214 OFFICE O/P EST MOD 30 MIN: CPT | Performed by: UROLOGY

## 2019-03-27 PROCEDURE — 81003 URINALYSIS AUTO W/O SCOPE: CPT | Performed by: UROLOGY

## 2019-03-27 NOTE — PROGRESS NOTES
Assessment/Plan:      Diagnoses and all orders for this visit:    Malignant neoplasm of prostate ([address] )  -     POCT urine dip auto non-scope  -     PSA Total, Diagnostic; Future    History of robot-assisted laparoscopic radical prostatectomy          Subjective:  No complaints     Patient ID: Jim Echols is a 61 y o  male  HPI  He is now 1 year after his laparoscopic robotic radical prostatectomy  Parisa Bangura feels well has a good force of stream and good urinary control  He admits to continuing with his Kegel exercises routinely, as recommended  He denies any pain, discomfort in urination, hematuria or dysuria  He is patiently awaiting to see if his erectile function returns on its own, and when asked, he is not interested any of the pharmacologic remedies at this time  Review of Systems   Constitutional: Negative  HENT: Negative  Eyes: Negative  Respiratory: Negative  Cardiovascular: Negative  Gastrointestinal: Negative  Endocrine: Negative  Genitourinary: Negative  Musculoskeletal: Negative  Skin: Negative  Allergic/Immunologic: Negative  Neurological: Negative  Hematological: Negative  Psychiatric/Behavioral: Negative  Objective:     Physical Exam   Constitutional: He is oriented to person, place, and time  He appears well-developed and well-nourished  No distress  HENT:   Head: Normocephalic and atraumatic  Nose: Nose normal    Mouth/Throat: Oropharynx is clear and moist    Eyes: Pupils are equal, round, and reactive to light  Conjunctivae and EOM are normal  No scleral icterus  Neck: Normal range of motion  Neck supple  Cardiovascular: Normal rate, regular rhythm, normal heart sounds and intact distal pulses  No murmur heard  Pulmonary/Chest: Effort normal and breath sounds normal  No respiratory distress  He has no wheezes  He has no rales  Abdominal: Soft  Bowel sounds are normal  He exhibits no distension and no mass  There is no tenderness  Genitourinary: Rectum normal    Musculoskeletal: Normal range of motion  He exhibits no edema or tenderness  Lymphadenopathy:     He has no cervical adenopathy  Neurological: He is alert and oriented to person, place, and time  No cranial nerve deficit  Skin: Skin is warm and dry  No rash noted  No erythema  No pallor  Psychiatric: He has a normal mood and affect  His behavior is normal  Judgment and thought content normal    Nursing note and vitals reviewed        His PSA from 03/04/2019 is less than 0 1

## 2019-09-27 ENCOUNTER — APPOINTMENT (OUTPATIENT)
Dept: LAB | Facility: MEDICAL CENTER | Age: 64
End: 2019-09-27
Attending: UROLOGY
Payer: COMMERCIAL

## 2019-09-27 DIAGNOSIS — C61 MALIGNANT NEOPLASM OF PROSTATE (HCC): ICD-10-CM

## 2019-09-27 LAB — PSA SERPL-MCNC: <0.1 NG/ML (ref 0–4)

## 2019-09-27 PROCEDURE — 84153 ASSAY OF PSA TOTAL: CPT

## 2019-10-04 ENCOUNTER — OFFICE VISIT (OUTPATIENT)
Dept: UROLOGY | Facility: MEDICAL CENTER | Age: 64
End: 2019-10-04
Payer: COMMERCIAL

## 2019-10-04 VITALS
DIASTOLIC BLOOD PRESSURE: 80 MMHG | WEIGHT: 197 LBS | HEIGHT: 68 IN | BODY MASS INDEX: 29.86 KG/M2 | SYSTOLIC BLOOD PRESSURE: 122 MMHG | HEART RATE: 77 BPM

## 2019-10-04 DIAGNOSIS — N52.31 ERECTILE DYSFUNCTION AFTER RADICAL PROSTATECTOMY: ICD-10-CM

## 2019-10-04 DIAGNOSIS — Z90.79 HISTORY OF ROBOT-ASSISTED LAPAROSCOPIC RADICAL PROSTATECTOMY: ICD-10-CM

## 2019-10-04 DIAGNOSIS — C61 MALIGNANT NEOPLASM OF PROSTATE (HCC): Primary | ICD-10-CM

## 2019-10-04 LAB
SL AMB  POCT GLUCOSE, UA: ABNORMAL
SL AMB LEUKOCYTE ESTERASE,UA: ABNORMAL
SL AMB POCT BILIRUBIN,UA: ABNORMAL
SL AMB POCT BLOOD,UA: ABNORMAL
SL AMB POCT CLARITY,UA: CLEAR
SL AMB POCT COLOR,UA: YELLOW
SL AMB POCT KETONES,UA: ABNORMAL
SL AMB POCT NITRITE,UA: ABNORMAL
SL AMB POCT PH,UA: 6
SL AMB POCT SPECIFIC GRAVITY,UA: 1.02
SL AMB POCT URINE PROTEIN: ABNORMAL
SL AMB POCT UROBILINOGEN: 0.2

## 2019-10-04 PROCEDURE — 81003 URINALYSIS AUTO W/O SCOPE: CPT | Performed by: UROLOGY

## 2019-10-04 PROCEDURE — 99214 OFFICE O/P EST MOD 30 MIN: CPT | Performed by: UROLOGY

## 2019-10-04 NOTE — PROGRESS NOTES
Assessment/Plan:      Diagnoses and all orders for this visit:    Malignant neoplasm of prostate ([address] )  -     POCT urine dip auto non-scope  -     PSA Total, Diagnostic; Future    History of robot-assisted laparoscopic radical prostatectomy    Erectile dysfunction after radical prostatectomy          Subjective:   No complaints     Patient ID: Shital Rockwell is a 59 y o  male  HPI  He is now 1-1/2 years after his laparoscopic robotic radical prostatectomy  Mabel Mcrae feels well has a good force of stream and very good urinary control  He admits to continuing with his Kegel exercises routinely, as recommended    He denies any pain, discomfort in urination, hematuria or dysuria       He is patiently awaiting to see if his erectile function returns on its own, and when asked, he is not interested any of the pharmacologic remedies at this time  Review of Systems   Constitutional: Negative  HENT: Negative  Eyes: Negative  Respiratory: Negative  Cardiovascular: Negative  Gastrointestinal: Negative  Endocrine: Negative  Genitourinary: Negative  Musculoskeletal: Negative  Skin: Negative  Allergic/Immunologic: Negative  Neurological: Negative  Hematological: Negative  Psychiatric/Behavioral: Negative  Objective:     Physical Exam   Constitutional: He is oriented to person, place, and time  He appears well-developed and well-nourished  No distress  HENT:   Head: Normocephalic and atraumatic  Nose: Nose normal    Mouth/Throat: Oropharynx is clear and moist    Eyes: Pupils are equal, round, and reactive to light  Conjunctivae and EOM are normal  No scleral icterus  Neck: Normal range of motion  Neck supple  Cardiovascular: Normal rate, regular rhythm, normal heart sounds and intact distal pulses  No murmur heard  Pulmonary/Chest: Effort normal and breath sounds normal  No respiratory distress  He has no wheezes  He has no rales  Abdominal: Soft   Bowel sounds are normal  He exhibits no distension and no mass  There is no tenderness  Musculoskeletal: Normal range of motion  He exhibits no edema or tenderness  Lymphadenopathy:     He has no cervical adenopathy  Neurological: He is alert and oriented to person, place, and time  No cranial nerve deficit  Skin: Skin is warm and dry  No rash noted  No erythema  No pallor  Psychiatric: He has a normal mood and affect  His behavior is normal  Judgment and thought content normal    Nursing note and vitals reviewed        PSA Total, Diagnostic    Ref Range & Units 9/27/19  8:31 AM 3/14/19  8:22 AM 9/11/18 10:12 AM   PSA, Diagnostic 0 0 - 4 0 ng/mL <0 1  <0 1 CM <0 1 CM   Comment:

## 2020-04-03 ENCOUNTER — APPOINTMENT (OUTPATIENT)
Dept: LAB | Facility: MEDICAL CENTER | Age: 65
End: 2020-04-03
Attending: UROLOGY
Payer: COMMERCIAL

## 2020-04-03 ENCOUNTER — TELEPHONE (OUTPATIENT)
Dept: UROLOGY | Facility: MEDICAL CENTER | Age: 65
End: 2020-04-03

## 2020-04-03 DIAGNOSIS — C61 MALIGNANT NEOPLASM OF PROSTATE (HCC): ICD-10-CM

## 2020-04-03 LAB — PSA SERPL-MCNC: <0.1 NG/ML (ref 0–4)

## 2020-04-03 PROCEDURE — 84153 ASSAY OF PSA TOTAL: CPT

## 2020-04-08 ENCOUNTER — TELEMEDICINE (OUTPATIENT)
Dept: UROLOGY | Facility: MEDICAL CENTER | Age: 65
End: 2020-04-08
Payer: COMMERCIAL

## 2020-04-08 DIAGNOSIS — C61 PROSTATE CANCER (HCC): Primary | ICD-10-CM

## 2020-04-08 DIAGNOSIS — Z90.79 HISTORY OF ROBOT-ASSISTED LAPAROSCOPIC RADICAL PROSTATECTOMY: ICD-10-CM

## 2020-04-08 DIAGNOSIS — N52.31 ERECTILE DYSFUNCTION AFTER RADICAL PROSTATECTOMY: ICD-10-CM

## 2020-04-08 PROCEDURE — 99214 OFFICE O/P EST MOD 30 MIN: CPT | Performed by: UROLOGY

## 2020-10-22 DIAGNOSIS — C61 PROSTATE CANCER (HCC): Primary | ICD-10-CM

## 2020-10-30 ENCOUNTER — TELEPHONE (OUTPATIENT)
Dept: UROLOGY | Facility: MEDICAL CENTER | Age: 65
End: 2020-10-30

## 2020-11-02 ENCOUNTER — LAB (OUTPATIENT)
Dept: LAB | Facility: MEDICAL CENTER | Age: 65
End: 2020-11-02
Attending: UROLOGY
Payer: COMMERCIAL

## 2020-11-02 DIAGNOSIS — C61 PROSTATE CANCER (HCC): ICD-10-CM

## 2020-11-02 LAB — PSA SERPL-MCNC: <0.1 NG/ML (ref 0–4)

## 2020-11-02 PROCEDURE — 84153 ASSAY OF PSA TOTAL: CPT

## 2020-11-09 ENCOUNTER — OFFICE VISIT (OUTPATIENT)
Dept: UROLOGY | Facility: MEDICAL CENTER | Age: 65
End: 2020-11-09
Payer: COMMERCIAL

## 2020-11-09 VITALS
SYSTOLIC BLOOD PRESSURE: 120 MMHG | TEMPERATURE: 96.8 F | WEIGHT: 197 LBS | BODY MASS INDEX: 29.86 KG/M2 | DIASTOLIC BLOOD PRESSURE: 70 MMHG | HEIGHT: 68 IN | HEART RATE: 78 BPM

## 2020-11-09 DIAGNOSIS — Z90.79 HISTORY OF ROBOT-ASSISTED LAPAROSCOPIC RADICAL PROSTATECTOMY: ICD-10-CM

## 2020-11-09 DIAGNOSIS — Z85.46 HISTORY OF PROSTATE CANCER: Primary | ICD-10-CM

## 2020-11-09 LAB
SL AMB  POCT GLUCOSE, UA: NORMAL
SL AMB LEUKOCYTE ESTERASE,UA: NORMAL
SL AMB POCT BILIRUBIN,UA: NORMAL
SL AMB POCT BLOOD,UA: NORMAL
SL AMB POCT CLARITY,UA: CLEAR
SL AMB POCT COLOR,UA: YELLOW
SL AMB POCT KETONES,UA: NORMAL
SL AMB POCT NITRITE,UA: NORMAL
SL AMB POCT PH,UA: 5.5
SL AMB POCT SPECIFIC GRAVITY,UA: 1.02
SL AMB POCT URINE PROTEIN: NORMAL
SL AMB POCT UROBILINOGEN: 0.2

## 2020-11-09 PROCEDURE — 81003 URINALYSIS AUTO W/O SCOPE: CPT | Performed by: UROLOGY

## 2020-11-09 PROCEDURE — 99214 OFFICE O/P EST MOD 30 MIN: CPT | Performed by: UROLOGY

## 2021-03-15 DIAGNOSIS — R09.81 NASAL CONGESTION: ICD-10-CM

## 2021-03-15 DIAGNOSIS — R50.9 FEVER, UNSPECIFIED FEVER CAUSE: ICD-10-CM

## 2021-03-15 LAB — SARS-COV-2 RNA RESP QL NAA+PROBE: NEGATIVE

## 2021-03-15 PROCEDURE — U0005 INFEC AGEN DETEC AMPLI PROBE: HCPCS | Performed by: FAMILY MEDICINE

## 2021-03-15 PROCEDURE — U0003 INFECTIOUS AGENT DETECTION BY NUCLEIC ACID (DNA OR RNA); SEVERE ACUTE RESPIRATORY SYNDROME CORONAVIRUS 2 (SARS-COV-2) (CORONAVIRUS DISEASE [COVID-19]), AMPLIFIED PROBE TECHNIQUE, MAKING USE OF HIGH THROUGHPUT TECHNOLOGIES AS DESCRIBED BY CMS-2020-01-R: HCPCS | Performed by: FAMILY MEDICINE

## 2021-05-17 NOTE — H&P (VIEW-ONLY)
H&P Exam - Urology   Lucy Wilburn 58 y o  male MRN: [id number]  Unit/Bed#:  Encounter: [id number]    Assessment/Plan   Assessment:  Localized prostate cancer    Plan:  Plan:  LAP ROBOTIC RADICAL PROSTATECTOMY AND THE PL AND BPLND  After careful review and summary of the medical history and all laboratory and radiographic studies as outlined above, I discussed with the patient and family members at length the finding of adenocarcinoma of the prostate  We discussed the patient's grade ([place] score 6 and 7), other parameters, and clinical stage based upon the above findings  I then discussed the options available to him at this time, based on his clinically localized prostate cancer  These include observation, radiation therapy, radical prostatectomy and cryosurgery  Regarding observation, they understand this is ideally suited for men with either clinically insignificant or slow growing cancer or for whom because of age or other concurrent medical conditions the risks of treatment are greater than the potential benefits of treatment  While this option avoids treatment-associated side effects, I explained it requires frequent evaluation in order to monitor for potential disease progression, and that despite vigilant follow-up there will be cases where the cancer spreads and is no longer potentially curable with local therapies  I reiterated that for many men, watchful waiting can be associated with increased anxiety and stress concerning their diagnosis, and that commonly patients only defer definitive treatment to a later date  With regard to radiation therapy, I reviewed the options available  These include external beam radiation therapy versus brachytherapy  Regarding XRT, we discussed the differences in conventional versus 3D conformal external beam radiation therapy versus IMRT   The indications, risks and potential complications of each of these options as well as the rationale for using selective radiation options were reviewed in detail  We also discussed interstitial seed therapy and risk stratification as a determinant for optimal therapies  All questions were again answered  I did offer to refer the patient to the radiation oncologists directly in order to further clarify these issues  With regard to the surgical options, we reviewed the different options including a radical perineal prostatectomy versus the radical retropubic prostatectomy via open, laparoscopic and robotic approach  I explained the operations that I primarily perform, robotic-assisted laparoscopic radical prostatectomy, and compared it with the alternative that I am also experienced with, traditional open retropubic prostatectomy  I also mentioned the less commonly used but accepted perineal approach  The advantages and limitations of these various approaches were described in detail  The anticipated hospital and post-operative courses were reviewed  I highlighted the relevant anatomy, and we discussed the importance of neurovascular bundle preservation (nerve-sparing) to minimize negative effects on erectile function and continence  While in the majority of cases bilateral nerve-sparing is appropriate and possible, they understand that in certain circumstances intentional partial or complete unilateral or bilateral neurovascular bundle resection is necessary when there is suspicion that cancer extends into that region; this may be determined either preoperatively or intraoperatively with or without biopsy  The patient is clearly aware that he may still be rendered sexually impotent and incontinent as a consequence of the operative procedure  The possibility of stress-type urinary incontinence associated with either form of radical prostatectomy was also detailed for the patient   I explained that concurrent pelvic lymphadenectomy may be performed, for intermediate, or high risk clinical disease or suspicious intraoperative adenopathy, which serves both a diagnostic and potentially therapeutic purpose if metastatic disease is identified  We discussed the general risks of surgery, which include but are not limited to infection, bleeding, medical and anesthetic complications, and adjacent organ involvement or injury  Being the patient had a prior laparoscopic inguinal hernia repair with mesh in the past, because of substantial risk of perivesical adhesions preventing access the appropriate regions of the pelvis via the robotic laparoscopic approach, he and his wife are aware and understand there is a distinct chance that we may need to convert the patient to an open traditional retropubic approach  With regard to cryosurgery, this is felt to be an experimental option at this time secondary to insufficient data to support its routine use in this clinical setting  Regarding hormonal therapy, we discussed it is primarily used as systemic therapy for patients with advanced or metastatic disease, but that it also has been shown to be beneficial in conjunction with radiation therapy for certain categories of patients  The patient understands that hormonal therapy alone is not considered curative treatment and will slow the growth of but not eliminate prostate cancer  The patient was advised to become an active participant in their care and to become proactive in their care  They were encouraged to call my office with any unanswered questions or seek additional medical opinions at their discretion  They understand that the decision as to which approach to take is one made based on the medical risks and benefits as well as their own informed tolerance of this risk  He was offered referral to radiation oncology and is interested in surgery  Once the patient is scheduled for a RALP/PLND, he will need preadmission testing and anesthesia clearance   Kegel exercises were reviewed, and he may be seen, pre operatively, by our continence nurse  I have discussed the risks, benefits and alternatives to the proposed procedure/treatment plan with the patient   Our discussion also included the risks and benefits of the alternatives, including doing nothing  He was encouraged to ask questions and all questions were answered to their satisfaction  At the end of our discussion the patient gave their verbal consent to the proposed procedure/treatment plan, robotic radical prostatectomy and BTLND  History of Present Illness      HPI:  Jennifer Cooper is a 58 y o  male who presents with PSA of 14  Underwent a prostate biopsy results of which are below:  A  Prostate, left lateral base, core needle biopsy:             - Prostatic adenocarcinoma, Shae score 3 + 3 = 6, Prognostic Grade Group I, discontinuously involving approximately 50% of one core biopsy  - The diagnosis is supported by negative staining for basal cell markers (p63/), and positive luminal staining for p504s (prostate triple stain, performed with an appropriate control)      B  Prostate, left base, core needle biopsy:             - Benign prostate glands  - No malignancy is identified      C  Prostate, right base, core needle biopsy:             - Prostatic adenocarcinoma, [place] score 3 + 3 = 6, Prognostic Grade Group I, involving less than 5% of one core biopsy  - The diagnosis is supported by negative staining for basal cell markers (p63/), and positive luminal staining for p504s (prostate triple stain, performed with an appropriate control)      D  Prostate, right lateral base, core needle biopsy:             - Benign prostate glands  - No malignancy is identified      E  Prostate, left lateral mid, core needle biopsy:             - Prostatic adenocarcinoma, Shae score 3 + 3 = 6, Prognostic Grade Group I, discontinuously involving 90% of one core biopsy               - The diagnosis is supported by negative staining for basal cell markers (p63/), and positive luminal staining for p504s (prostate triple stain, performed with an appropriate control)      F  Prostate, left mid, core needle biopsy:             - Prostatic adenocarcinoma, [place] score 3 + 3 = 6, Prognostic Grade Group I, discontinuously involving 60% of one core biopsy  - The diagnosis is supported by negative staining for basal cell markers (p63/), and positive luminal staining for p504s (prostate triple stain, performed with an appropriate control)      G  Prostate, right mid, core needle biopsy:             - Prostatic adenocarcinoma, Shae score 3 + 3 = 6, Prognostic Grade Group I, involving less than 5% of one core biopsy  - The diagnosis is supported by negative staining for basal cell markers (p63/), and positive luminal staining for p504s (prostate triple stain, performed with an appropriate control)       H  Prostate, right lateral mid, core needle biopsy:             - Prostatic adenocarcinoma, Shae score 3 + 3 = 6, Prognostic Grade Group I, involving 20% of one core biopsy  - The diagnosis is supported by negative staining for basal cell markers (p63/), and positive luminal staining for p504s (prostate triple stain, performed with an appropriate control)      I  Prostate, left lateral apex, core needle biopsy:             - Prostatic adenocarcinoma, Shae score 3 + 4 = 7, Prognostic Grade Group II, involving 80% of one core biopsy              - Less than 5% [place] grade 4 prostatic adenocarcinoma  - The diagnosis is supported by negative staining for basal cell markers (p63/), and positive luminal staining for p504s (prostate triple stain, performed with an appropriate control)      J  Prostate, left apex, core needle biopsy:             - Prostatic adenocarcinoma, Shae score 3 + 4 = 7, Prognostic Grade Group II, involving 90% of one core biopsy               - Less than 10% Shae grade 4 prostatic adenocarcinoma  - The diagnosis is supported by negative staining for basal cell markers (p63/), and positive luminal staining for p504s (prostate triple stain, performed with an appropriate control)      K  Prostate, right apex, core needle biopsy:             - Benign prostate glands  - No malignancy is identified      L  Prostate, right lateral apex, core needle biopsy:             - Prostatic adenocarcinoma, Shae score 3 + 3 = 6, Prognostic Grade Group I, involving approximately 5% of one core biopsy  - The diagnosis is supported by negative staining for basal cell markers (p63/), and positive luminal staining for p504s (prostate triple stain, performed with an appropriate control)  Review of Systems   Constitutional: Negative  HENT: Negative  Eyes: Negative  Respiratory: Negative  Cardiovascular: Negative  Gastrointestinal: Negative  Endocrine: Negative  Genitourinary: Positive for urgency  Musculoskeletal: Positive for arthralgias and back pain  Skin: Negative  Allergic/Immunologic: Negative  Neurological: Negative  Hematological: Negative  Psychiatric/Behavioral: Negative          Historical Information   Past Medical History:   Diagnosis Date    BPH with obstruction/lower urinary tract symptoms 2013    Elevated PSA 2013     Past Surgical History:   Procedure Laterality Date    PROSTATE BIOPSY  2018     Social History   History   Alcohol Use    1 2 oz/week    2 Cans of beer per week     Comment: socially     History   Drug Use No     History   Smoking Status    Former Smoker    Types: Cigarettes    Quit date: 2/1/1998   Smokeless Tobacco    Never Used     Family History:   Family History   Problem Relation Age of Onset    Heart disease Father        Meds/Allergies   all medications and allergies reviewed  Allergies   Allergen Reactions    Sulfa Antibiotics Objective   Vitals: Blood pressure 116/68, height 5' 8" (1 727 m), weight 86 6 kg (191 lb)  Invasive Devices          No matching active lines, drains, or airways          Physical Exam   Constitutional: He is oriented to person, place, and time  He appears well-developed  HENT:   Head: Normocephalic and atraumatic  Eyes: Conjunctivae and EOM are normal  Pupils are equal, round, and reactive to light  Neck: Normal range of motion  Neck supple  Cardiovascular: Normal rate, regular rhythm, normal heart sounds and intact distal pulses  Pulmonary/Chest: Effort normal and breath sounds normal    Abdominal: Soft  Bowel sounds are normal    Genitourinary: Rectum normal, prostate normal and penis normal    Genitourinary Comments: About 60 gms  Musculoskeletal: Normal range of motion  Neurological: He is alert and oriented to person, place, and time  Skin: Skin is warm and dry  Psychiatric: He has a normal mood and affect  His behavior is normal  Judgment and thought content normal        Lab Results: I have personally reviewed pertinent reports  Imaging: I have personally reviewed pertinent reports  EKG, Pathology, and Other Studies: I have personally reviewed pertinent reports  Counseling / Coordination of Care  Total floor / unit time spent today 80 minutes  Greater than 50% of total time was spent with the patient and / or family counseling and / or coordination of care    A description of the counseling / coordination of care: today

## 2021-11-09 ENCOUNTER — APPOINTMENT (OUTPATIENT)
Dept: LAB | Facility: MEDICAL CENTER | Age: 66
End: 2021-11-09
Attending: UROLOGY
Payer: COMMERCIAL

## 2021-11-09 DIAGNOSIS — Z90.79 HISTORY OF ROBOT-ASSISTED LAPAROSCOPIC RADICAL PROSTATECTOMY: ICD-10-CM

## 2021-11-09 LAB — PSA SERPL-MCNC: <0.1 NG/ML (ref 0–4)

## 2021-11-09 PROCEDURE — 36415 COLL VENOUS BLD VENIPUNCTURE: CPT

## 2021-11-09 PROCEDURE — G0103 PSA SCREENING: HCPCS

## 2021-11-18 ENCOUNTER — OFFICE VISIT (OUTPATIENT)
Dept: UROLOGY | Facility: MEDICAL CENTER | Age: 66
End: 2021-11-18
Payer: COMMERCIAL

## 2021-11-18 VITALS
BODY MASS INDEX: 28.2 KG/M2 | HEIGHT: 70 IN | HEART RATE: 66 BPM | SYSTOLIC BLOOD PRESSURE: 108 MMHG | WEIGHT: 197 LBS | DIASTOLIC BLOOD PRESSURE: 80 MMHG

## 2021-11-18 DIAGNOSIS — Z90.79 HISTORY OF ROBOT-ASSISTED LAPAROSCOPIC RADICAL PROSTATECTOMY: ICD-10-CM

## 2021-11-18 DIAGNOSIS — Z85.46 HISTORY OF PROSTATE CANCER: Primary | ICD-10-CM

## 2021-11-18 PROCEDURE — 81003 URINALYSIS AUTO W/O SCOPE: CPT | Performed by: UROLOGY

## 2021-11-18 PROCEDURE — 99214 OFFICE O/P EST MOD 30 MIN: CPT | Performed by: UROLOGY

## 2021-11-19 PROBLEM — Z90.79 HISTORY OF ROBOT-ASSISTED LAPAROSCOPIC RADICAL PROSTATECTOMY: Status: RESOLVED | Noted: 2020-11-09 | Resolved: 2021-11-19

## 2023-01-10 ENCOUNTER — APPOINTMENT (OUTPATIENT)
Dept: LAB | Facility: MEDICAL CENTER | Age: 68
End: 2023-01-10

## 2023-01-10 DIAGNOSIS — Z85.46 HISTORY OF PROSTATE CANCER: ICD-10-CM

## 2023-01-10 LAB — PSA SERPL-MCNC: <0.1 NG/ML (ref 0–4)

## 2023-01-11 NOTE — PROGRESS NOTES
[address] [facility]  [facility]  [address] [facility]  [place], [address] [place] [address]  [phone]  www  [email or web address]  [email or web address]      NAME: Dior Meza  AGE: 79 y o  SEX: male  : 1955   MRN: [id number]    DATE: 2023  TIME: 9:08 AM    Assessment and Plan:    Prostate cancer as below: Walker 3+4 equal 7PT2C with positive margins PSA remains undetectable  Recheck PSA in 1 year  Mild stress incontinence  Chief Complaint     Chief Complaint   Patient presents with   • Follow-up   • Prostate Cancer     Annual visit with PSA screen <0 1       History of Present Illness   Shae 6 and 7 prostate cancer, encompassing the entire gland with pretreatment elevation to 14 with PSA-underwent transrectal office prostate biopsy by me 2018-CT scan and bone scan were negative and I recommended robotic prostatectomy  Dr Dashawn Whyte did this robotically 2018 with bilateral lymph node dissection  No adjuvant radiation  He had positive margins  He had staged [id number]  Final pathology showed Shae 3+4 equal 7 grade group 2  PSA remains undetectable as of January 10, 2023  Voids about every 2-3 hours  Has stress incontinence if he does anything strenuous  He is retired  Bowels are okay, no gross hematuria  Creatinine normal at         The following portions of the patient's history were reviewed and updated as appropriate: allergies, current medications, past family history, past medical history, past social history, past surgical history and problem list   Past Medical History:   Diagnosis Date   • BPH with obstruction/lower urinary tract symptoms    • Elevated PSA    • Prostate cancer [facility])    • Wears glasses      Past Surgical History:   Procedure Laterality Date   • COLONOSCOPY     • HERNIA REPAIR Right     inguinal hernia repair   • [facility] LAP,PROSTATECTOMY,RADICAL,W/NERVE SPARE,INCL ROBOTIC N/A 3/12/2018 Procedure: LAPAROSCOPIC ROBOTIC RADICAL PROSTATECTOMY, BILATERAL PELVIC LYMPH NODE DISSECTION;  Surgeon: Suresh Lopez MD;  Location[facility];  Service: Urology   • PROSTATE BIOPSY  2018   • WISDOM TOOTH EXTRACTION       shoulder  Review of Systems   Review of Systems   Gastrointestinal: Negative  Genitourinary:        As per HPI   Musculoskeletal: Positive for arthralgias  Negative for back pain  Active Problem List     Patient Active Problem List   Diagnosis   • Malignant neoplasm of prostate ([address] )   • History of prostate cancer       Objective   /78 (BP Location: Left arm, Patient Position: Sitting)   Pulse 89   Ht 5' 9" (1 753 m)   Wt 93 kg (205 lb)   SpO2 96%   BMI 30 27 kg/m²     Physical Exam  Vitals reviewed  Constitutional:       Appearance: Normal appearance  He is normal weight  HENT:      Head: Normocephalic and atraumatic  Eyes:      Extraocular Movements: Extraocular movements intact  Pulmonary:      Effort: Pulmonary effort is normal    Musculoskeletal:         General: Normal range of motion  Cervical back: Normal range of motion  Skin:     Coloration: Skin is not jaundiced or pale  Neurological:      General: No focal deficit present  Mental Status: He is alert and oriented to person, place, and time  Mental status is at baseline  Psychiatric:         Mood and Affect: Mood normal          Behavior: Behavior normal          Thought Content:  Thought content normal          Judgment: Judgment normal              Current Medications     Current Outpatient Medications:   •  Diclofenac Sodium (VOLTAREN) 1 %, APPLY (4G) BY TOPICAL ROUTE 3 TIMES EVERY DAY TO THE AFFECTED AREA(S), Disp: , Rfl:   •  Multiple Vitamins-Minerals (ONE DAILY MULTIVITAMIN ADULT) TABS, Take 1 tablet by mouth daily, Disp: , Rfl:   •  naproxen sodium (ALEVE) 220 MG tablet, Take 220 mg by mouth, Disp: , Rfl:   •  Omega-3 Fatty Acids (FISH OIL PO), Take 3 capsules by mouth daily, Disp: , Rfl:         Kyra Cali MD

## 2023-01-12 ENCOUNTER — OFFICE VISIT (OUTPATIENT)
Dept: UROLOGY | Facility: MEDICAL CENTER | Age: 68
End: 2023-01-12

## 2023-01-12 VITALS
DIASTOLIC BLOOD PRESSURE: 78 MMHG | WEIGHT: 205 LBS | HEIGHT: 69 IN | BODY MASS INDEX: 30.36 KG/M2 | HEART RATE: 89 BPM | OXYGEN SATURATION: 96 % | SYSTOLIC BLOOD PRESSURE: 118 MMHG

## 2023-01-12 DIAGNOSIS — Z90.79 HISTORY OF ROBOT-ASSISTED LAPAROSCOPIC RADICAL PROSTATECTOMY: ICD-10-CM

## 2023-01-12 DIAGNOSIS — C61 PROSTATE CANCER (HCC): Primary | ICD-10-CM

## 2023-01-12 RX ORDER — NAPROXEN SODIUM 220 MG
220 TABLET ORAL
COMMUNITY

## 2024-02-14 ENCOUNTER — TELEPHONE (OUTPATIENT)
Age: 69
End: 2024-02-14

## 2024-02-14 DIAGNOSIS — C61 PROSTATE CANCER (HCC): Primary | ICD-10-CM

## 2024-02-14 NOTE — TELEPHONE ENCOUNTER
Pt called to confirm his 02/26/24 appointment also questioning if psa needed as no order in chart please review and contact him directly.

## 2024-02-14 NOTE — TELEPHONE ENCOUNTER
Spoke to patient and advised of PSA order has been placed and to have this done prior to his appointment with Dr. Ivy. Patient is understanding.

## 2024-02-19 ENCOUNTER — APPOINTMENT (OUTPATIENT)
Dept: LAB | Facility: MEDICAL CENTER | Age: 69
End: 2024-02-19
Payer: COMMERCIAL

## 2024-02-19 DIAGNOSIS — C61 PROSTATE CANCER (HCC): ICD-10-CM

## 2024-02-19 LAB — PSA SERPL-MCNC: 0.01 NG/ML (ref 0–4)

## 2024-02-19 PROCEDURE — 36415 COLL VENOUS BLD VENIPUNCTURE: CPT

## 2024-02-19 PROCEDURE — 84153 ASSAY OF PSA TOTAL: CPT

## 2024-02-26 ENCOUNTER — OFFICE VISIT (OUTPATIENT)
Dept: UROLOGY | Facility: CLINIC | Age: 69
End: 2024-02-26
Payer: COMMERCIAL

## 2024-02-26 VITALS
HEIGHT: 68 IN | DIASTOLIC BLOOD PRESSURE: 78 MMHG | HEART RATE: 68 BPM | TEMPERATURE: 97.5 F | OXYGEN SATURATION: 98 % | BODY MASS INDEX: 31.49 KG/M2 | SYSTOLIC BLOOD PRESSURE: 138 MMHG | WEIGHT: 207.8 LBS

## 2024-02-26 DIAGNOSIS — N39.3 STRESS INCONTINENCE AFTER SURGICAL PROCEDURE: ICD-10-CM

## 2024-02-26 DIAGNOSIS — C61 PROSTATE CANCER (HCC): Primary | ICD-10-CM

## 2024-02-26 DIAGNOSIS — N99.89 STRESS INCONTINENCE AFTER SURGICAL PROCEDURE: ICD-10-CM

## 2024-02-26 DIAGNOSIS — Z90.79 HISTORY OF ROBOT-ASSISTED LAPAROSCOPIC RADICAL PROSTATECTOMY: ICD-10-CM

## 2024-02-26 PROCEDURE — 99213 OFFICE O/P EST LOW 20 MIN: CPT | Performed by: UROLOGY

## 2024-02-26 NOTE — PROGRESS NOTES
UROLOGY PROGRESS NOTE   [facility]  [address] [place] [address] [place]  [address]  [place] [address]  [phone]  Fax:[phone]  [email or web address]      NAME: Jorge Durán  AGE: 68 y.o. SEX: male  : 1955   MRN: [id number]    DATE: 2024  TIME: 9:58 AM    Assessment and Plan:    Prostate cancer as below: PSA is now detectable at 0.01.  Recheck another PSA in 6 months and go from there.  Discussed the implications of this.  Otherwise he remains quite healthy and his stress incontinence is stable.  I do not recommend any form of treatment for the stress incontinence other than exercises.               Chief Complaint     Chief Complaint   Patient presents with    Follow-up       History of Present Illness   Shae 6 and 7 prostate cancer, encompassing the entire gland with pretreatment elevation to 14 with PSA-underwent transrectal office prostate biopsy by me 2018-CT scan and bone scan were negative and I recommended robotic prostatectomy.  Dr. Dawson did this robotically 2018 with bilateral lymph node dissection.  No adjuvant radiation.  He had positive margins.  He had staged [id number].  Final pathology showed [place] 3+4 equal 7 grade group 2.  PSA is now detectable at 0.01 as of 2024, and it has been undetectable previously.  Voids about every 2-3 hours.  Has stress incontinence if he does anything strenuous.  He continues with the leakage with anything strenuous.  Nocturia 2 times a night.  He does not have to wear a pad.      The following portions of the patient's history were reviewed and updated as appropriate: allergies, current medications, past family history, past medical history, past social history, past surgical history and problem list.  Past Medical History:   Diagnosis Date    BPH with obstruction/lower urinary tract symptoms     Elevated PSA     Prostate cancer (HCC)     Wears glasses      Past Surgical History:   Procedure  "Laterality Date    COLONOSCOPY      HERNIA REPAIR Right     inguinal hernia repair    NE LAPS SURG [id number] RPBIC RAD W/NRV SPARING ROBOT N/A 3/12/2018    Procedure: LAPAROSCOPIC ROBOTIC RADICAL PROSTATECTOMY, BILATERAL PELVIC LYMPH NODE DISSECTION;  Surgeon: Thai Dawson MD;  [facility];  Service: Urology    PROSTATE BIOPSY  2018    WISDOM TOOTH EXTRACTION       shoulder  Review of Systems   Review of Systems   Constitutional:  Negative for fever.   Respiratory:  Negative for shortness of breath.    Cardiovascular:  Negative for chest pain.   Genitourinary:  Negative for difficulty urinating, dysuria, frequency, hematuria, scrotal swelling and testicular pain.       Active Problem List     Patient Active Problem List   Diagnosis    Malignant neoplasm of prostate (HCC)    History of prostate cancer       Objective   /78 (BP Location: Right arm, Patient Position: Sitting, Cuff Size: Large)   Pulse 68   Temp 97.5 °F (36.4 °C) (Temporal)   Ht 5' 8\" (1.727 m)   Wt 94.3 kg (207 lb 12.8 oz)   SpO2 98%   BMI 31.60 kg/m²     Physical Exam  Vitals reviewed.   Constitutional:       Appearance: Normal appearance.   HENT:      Head: Normocephalic and atraumatic.   Eyes:      Extraocular Movements: Extraocular movements intact.   Pulmonary:      Effort: Pulmonary effort is normal.   Musculoskeletal:         General: Normal range of motion.      Cervical back: Normal range of motion.   Skin:     Coloration: Skin is not jaundiced or pale.   Neurological:      General: No focal deficit present.      Mental Status: He is alert and oriented to person, place, and time. Mental status is at baseline.   Psychiatric:         Mood and Affect: Mood normal.         Behavior: Behavior normal.         Thought Content: Thought content normal.         Judgment: Judgment normal.             Current Medications     Current Outpatient Medications:     Multiple Vitamins-Minerals (ONE DAILY MULTIVITAMIN ADULT) TABS, Take 1 " tablet by mouth daily, Disp: , Rfl:     Omega-3 Fatty Acids (FISH OIL PO), Take 3 capsules by mouth daily, Disp: , Rfl:     Diclofenac Sodium (VOLTAREN) 1 %, APPLY (4G) BY TOPICAL ROUTE 3 TIMES EVERY DAY TO THE AFFECTED AREA(S) (Patient not taking: Reported on 2/26/2024), Disp: , Rfl:     naproxen sodium (ALEVE) 220 MG tablet, Take 220 mg by mouth (Patient not taking: Reported on 2/26/2024), Disp: , Rfl:         Darian Ivy MD

## 2024-09-10 ENCOUNTER — APPOINTMENT (OUTPATIENT)
Dept: LAB | Facility: MEDICAL CENTER | Age: 69
End: 2024-09-10
Payer: COMMERCIAL

## 2024-09-10 DIAGNOSIS — C61 PROSTATE CANCER (HCC): ICD-10-CM

## 2024-09-10 LAB — PSA SERPL-MCNC: 0.01 NG/ML (ref 0–4)

## 2024-09-10 PROCEDURE — 84153 ASSAY OF PSA TOTAL: CPT

## 2024-09-10 PROCEDURE — 36415 COLL VENOUS BLD VENIPUNCTURE: CPT

## 2024-09-16 ENCOUNTER — OFFICE VISIT (OUTPATIENT)
Dept: UROLOGY | Facility: CLINIC | Age: 69
End: 2024-09-16
Payer: COMMERCIAL

## 2024-09-16 VITALS
DIASTOLIC BLOOD PRESSURE: 68 MMHG | HEIGHT: 68 IN | WEIGHT: 205.4 LBS | SYSTOLIC BLOOD PRESSURE: 118 MMHG | HEART RATE: 82 BPM | OXYGEN SATURATION: 96 % | TEMPERATURE: 97.5 F | BODY MASS INDEX: 31.13 KG/M2 | RESPIRATION RATE: 16 BRPM

## 2024-09-16 DIAGNOSIS — C61 PROSTATE CANCER (HCC): Primary | ICD-10-CM

## 2024-09-16 PROCEDURE — 99213 OFFICE O/P EST LOW 20 MIN: CPT | Performed by: UROLOGY

## 2024-09-16 NOTE — PROGRESS NOTES
UROLOGY PROGRESS NOTE   [facility]  [address] [facility] [address]  [address]  [place] [address]  [phone]  Fax:[phone]  [email or web address]      NAME: Jorge Durán  AGE: 69 y.o. SEX: male  : 1955   MRN: [id number]    DATE: 2024  TIME: 10:48 AM    Assessment and Plan:    CAP as below- PSA basically the same as it always has been, just different measuring- can continue with yearly surveillance.    F/U 1 year with PSA.                   Chief Complaint     Chief Complaint   Patient presents with    Prostate Cancer       History of Present Illness   [place] 6 and 7 prostate cancer, encompassing the entire gland with pretreatment elevation to 14 with PSA-underwent transrectal office prostate biopsy by me 2018-CT scan and bone scan were negative and I recommended robotic prostatectomy.  Dr. Dawson did this robotically 2018 with bilateral lymph node dissection.  No adjuvant radiation.  He had positive margins.  He had staged [id number].  Final pathology showed Shae 3+4 equal 7 grade group 2.  PSA was detectable at 0.01 as of 2024, and it has been undetectable previously.  Voids about every 2-3 hours.  Has stress incontinence if he does anything strenuous.  He continues with the leakage with anything strenuous.  Nocturia 2 times a night.  He does not have to wear a pad.   Component  Ref Range & Units 9/10/24  8:00 AM 24  7:48 AM 1/10/23  7:39 AM 21  8:24 AM 20  9:11 AM 4/3/20  8:06 AM 19  8:31 AM   PSA, Diagnostic  0.000 - 4.000 ng/mL 0.012 0.01 R, CM <0.1 R, CM <0.1 R, CM <0.1 R, CM <0.1 R, CM <0.1 R, CM       The following portions of the patient's history were reviewed and updated as appropriate: allergies, current medications, past family history, past medical history, past social history, past surgical history and problem list.  Past Medical History:   Diagnosis Date    BPH with obstruction/lower urinary tract symptoms      "Elevated PSA 2013    Prostate cancer (HCC)     Wears glasses      Past Surgical History:   Procedure Laterality Date    COLONOSCOPY      HERNIA REPAIR Right     inguinal hernia repair    RI LAPS SURG [id number] RPBIC RAD W/NRV SPARING ROBOT N/A 3/12/2018    Procedure: LAPAROSCOPIC ROBOTIC RADICAL PROSTATECTOMY, BILATERAL PELVIC LYMPH NODE DISSECTION;  Surgeon: Thai Dawson MD;  [facility];  Service: Urology    PROSTATE BIOPSY  2018    WISDOM TOOTH EXTRACTION       shoulder  Review of Systems   Review of Systems   Genitourinary: Negative.        Active Problem List     Patient Active Problem List   Diagnosis    Malignant neoplasm of prostate (HCC)    History of prostate cancer       Objective   /68 (BP Location: Left arm, Patient Position: Sitting, Cuff Size: Adult)   Pulse 82   Temp 97.5 °F (36.4 °C) (Temporal)   Resp 16   Ht 5' 8\" (1.727 m)   Wt 93.2 kg (205 lb 6.4 oz)   SpO2 96%   BMI 31.23 kg/m²     Physical Exam  Vitals reviewed.   Constitutional:       Appearance: Normal appearance. He is normal weight.   HENT:      Head: Normocephalic and atraumatic.   Eyes:      Extraocular Movements: Extraocular movements intact.   Pulmonary:      Effort: Pulmonary effort is normal.   Musculoskeletal:         General: Normal range of motion.      Cervical back: Normal range of motion.   Skin:     Coloration: Skin is not jaundiced or pale.   Neurological:      General: No focal deficit present.      Mental Status: He is alert and oriented to person, place, and time. Mental status is at baseline.   Psychiatric:         Mood and Affect: Mood normal.         Behavior: Behavior normal.         Thought Content: Thought content normal.         Judgment: Judgment normal.             Current Medications     Current Outpatient Medications:     Multiple Vitamins-Minerals (ONE DAILY MULTIVITAMIN ADULT) TABS, Take 1 tablet by mouth daily, Disp: , Rfl:     Omega-3 Fatty Acids (FISH OIL PO), Take 3 capsules by " mouth daily, Disp: , Rfl:     Diclofenac Sodium (VOLTAREN) 1 %, APPLY (4G) BY TOPICAL ROUTE 3 TIMES EVERY DAY TO THE AFFECTED AREA(S) (Patient not taking: Reported on 2/26/2024), Disp: , Rfl:     naproxen sodium (ALEVE) 220 MG tablet, Take 220 mg by mouth (Patient not taking: Reported on 2/26/2024), Disp: , Rfl:         Darian Ivy MD

## 2025-04-21 ENCOUNTER — TELEPHONE (OUTPATIENT)
Age: 70
End: 2025-04-21

## 2025-04-21 NOTE — TELEPHONE ENCOUNTER
Patient was calling for a Sept appt with Dr. Ivy. All the follow up appts for Sept and Oct have been booked.     Patient is concerned that his insurance will not cover the appt he has because it is before the one year nirmal.     Can this please be reviewed and see if the patient can be scheduled after 9/16?    Patient is also checking with his insurance to see if they will cover the appt if it is before the 1 year nirmal

## 2025-08-04 ENCOUNTER — TELEPHONE (OUTPATIENT)
Age: 70
End: 2025-08-04

## 2025-08-18 ENCOUNTER — APPOINTMENT (OUTPATIENT)
Dept: LAB | Facility: MEDICAL CENTER | Age: 70
End: 2025-08-18
Attending: UROLOGY
Payer: COMMERCIAL

## 2025-08-18 DIAGNOSIS — C61 PROSTATE CANCER (HCC): ICD-10-CM

## 2025-08-18 LAB — PSA SERPL-MCNC: 0.03 NG/ML (ref 0–4)

## 2025-08-18 PROCEDURE — 36415 COLL VENOUS BLD VENIPUNCTURE: CPT

## 2025-08-18 PROCEDURE — 84153 ASSAY OF PSA TOTAL: CPT

## (undated) DEVICE — SUT MONOCRYL 4-0 PS-2 27 IN Y426H

## (undated) DEVICE — JP PERF DRN SIL FLT 10MM FULL: Brand: CARDINAL HEALTH

## (undated) DEVICE — GLOVE INDICATOR PI UNDERGLOVE SZ 7 BLUE

## (undated) DEVICE — GLOVE SRG BIOGEL 7.5

## (undated) DEVICE — ENDOPATH PNEUMONEEDLE INSUFFLATION NEEDLES WITH LUER LOCK CONNECTORS 120MM: Brand: ENDOPATH

## (undated) DEVICE — VIAL DECANTER

## (undated) DEVICE — TROCAR PORT ACCESS 12 X120MM W/BLDLS OPTICAL TIP AIRSEAL

## (undated) DEVICE — TIP COVER ACCESSORY

## (undated) DEVICE — 3M™ STERI-STRIP™ COMPOUND BENZOIN TINCTURE 40 BAGS/CARTON 4 CARTONS/CASE C1544: Brand: 3M™ STERI-STRIP™

## (undated) DEVICE — NEEDLE 25G X 1 1/2

## (undated) DEVICE — PK DISSECTING FORCEPS: Brand: ENDOWRIST;DAVINCI SI

## (undated) DEVICE — BOWL: 16OZ PEELPOUCH 75/CS 16/PLT: Brand: MEDEGEN MEDICAL PRODUCTS, LLC

## (undated) DEVICE — JACKSON-PRATT 100CC BULB RESERVOIR: Brand: CARDINAL HEALTH

## (undated) DEVICE — GLOVE INDICATOR PI UNDERGLOVE SZ 8 BLUE

## (undated) DEVICE — ENDOPATH XCEL BLADELESS TROCARS WITH STABILITY SLEEVES: Brand: ENDOPATH XCEL

## (undated) DEVICE — SUT VICRYL 0 UR-6 27 IN J603H

## (undated) DEVICE — AIRSEAL TUBE SMOKE EVAC LUMENX3 FILTERED

## (undated) DEVICE — PACK ROBOTIC PROSTATE PBDS DA VINCI SI/XI

## (undated) DEVICE — LUBRICANT INST ELECTROLUBE ANTISTK WO PAD

## (undated) DEVICE — SURGICEL 4 X 8

## (undated) DEVICE — GLOVE INDICATOR PI UNDERGLOVE SZ 6.5 BLUE

## (undated) DEVICE — SUT VICRYL 0 CT-1 27 IN J260H

## (undated) DEVICE — GLOVE SRG BIOGEL ECLIPSE 6.5

## (undated) DEVICE — DRAPE FLUID WARMER (BIRD BATH)

## (undated) DEVICE — URIMETER 2500ML

## (undated) DEVICE — ASTOUND STANDARD SURGICAL GOWN, XL: Brand: CONVERTORS

## (undated) DEVICE — SUT ETHILON 2-0 FS 18 IN 664H

## (undated) DEVICE — ENDOPOUCH RETRIEVER SPECIMEN RETRIEVAL BAGS: Brand: ENDOPOUCH RETRIEVER

## (undated) DEVICE — ROBOT ACCESSORY KIT 4 ARM

## (undated) DEVICE — SUT MONOCRYL 3-0 RB-1 27 IN Y305H

## (undated) DEVICE — GLOVE SRG BIOGEL 6

## (undated) DEVICE — 3M™ DURAPORE™ SURGICAL TAPE 1538-3, 3 INCH X 10 YARD (7,5CM X 9,1M), 4 ROLLS/BOX: Brand: 3M™ DURAPORE™

## (undated) DEVICE — TOWEL SET X-RAY

## (undated) DEVICE — ENDOPATH XCEL DILATING TIP TROCARS WITH STABILITY SLEEVES: Brand: ENDOPATH XCEL

## (undated) DEVICE — ENSEAL LAPAROSCOPIC TISSUE SEALER G2 CURVED JAW FOR USE WITH G2 GENERATOR 5MM DIAMETER 35CM SHAFT LENGTH: Brand: ENSEAL

## (undated) DEVICE — CATH FOLEY 20FR 30ML 2 WAY SILICONE-ELASTIMER

## (undated) DEVICE — DRAIN SPONGES,6 PLY: Brand: EXCILON

## (undated) DEVICE — MONOPOLAR CURVED SCISSORS: Brand: ENDOWRIST;DAVINCI SI

## (undated) DEVICE — SCD SEQUENTIAL COMPRESSION COMFORT SLEEVE MEDIUM KNEE LENGTH: Brand: KENDALL SCD

## (undated) DEVICE — COBRA GRASPER: Brand: ENDOWRIST;DAVINCI SI

## (undated) DEVICE — PERMANENT CAUTERY HOOK: Brand: ENDOWRIST;DAVINCI SI

## (undated) DEVICE — SUT MONOCRYL 3-0 RB-1 27 IN Y215H

## (undated) DEVICE — CHLORAPREP HI-LITE 26ML ORANGE

## (undated) DEVICE — ADHESIVE SKN CLSR HISTOACRYL FLEX 0.5ML LF

## (undated) DEVICE — GLOVE SRG BIOGEL 6.5

## (undated) DEVICE — SYRINGE 10ML SLIP TIP LF

## (undated) DEVICE — DRAPE SHEET X-LG

## (undated) DEVICE — LUBRICANT SURGILUBE TUBE 4 OZ  FLIP TOP

## (undated) DEVICE — LARGE NEEDLE DRIVER: Brand: ENDOWRIST;DAVINCI SI